# Patient Record
Sex: FEMALE | Race: WHITE | NOT HISPANIC OR LATINO | Employment: FULL TIME | URBAN - METROPOLITAN AREA
[De-identification: names, ages, dates, MRNs, and addresses within clinical notes are randomized per-mention and may not be internally consistent; named-entity substitution may affect disease eponyms.]

---

## 2017-01-31 ENCOUNTER — OFFICE VISIT (OUTPATIENT)
Dept: INTERNAL MEDICINE | Facility: CLINIC | Age: 47
End: 2017-01-31
Payer: COMMERCIAL

## 2017-01-31 VITALS
DIASTOLIC BLOOD PRESSURE: 90 MMHG | SYSTOLIC BLOOD PRESSURE: 130 MMHG | BODY MASS INDEX: 30.62 KG/M2 | HEIGHT: 63 IN | RESPIRATION RATE: 16 BRPM | WEIGHT: 172.81 LBS | TEMPERATURE: 98 F

## 2017-01-31 DIAGNOSIS — J98.8 RESPIRATORY INFECTION: Primary | ICD-10-CM

## 2017-01-31 PROBLEM — E66.811 OBESITY, CLASS I, BMI 30-34.9: Status: ACTIVE | Noted: 2017-01-31

## 2017-01-31 PROBLEM — E66.9 OBESITY, CLASS I, BMI 30-34.9: Status: ACTIVE | Noted: 2017-01-31

## 2017-01-31 PROCEDURE — 99999 PR PBB SHADOW E&M-EST. PATIENT-LVL III: CPT | Mod: PBBFAC,,, | Performed by: PHYSICIAN ASSISTANT

## 2017-01-31 PROCEDURE — 1159F MED LIST DOCD IN RCRD: CPT | Mod: S$GLB,,, | Performed by: PHYSICIAN ASSISTANT

## 2017-01-31 PROCEDURE — 99213 OFFICE O/P EST LOW 20 MIN: CPT | Mod: S$GLB,,, | Performed by: PHYSICIAN ASSISTANT

## 2017-01-31 RX ORDER — AZITHROMYCIN 500 MG/1
500 TABLET, FILM COATED ORAL DAILY
Qty: 10 TABLET | Refills: 0 | Status: SHIPPED | OUTPATIENT
Start: 2017-01-31 | End: 2017-02-10

## 2017-01-31 RX ORDER — BENZONATATE 200 MG/1
CAPSULE ORAL
Qty: 30 CAPSULE | Refills: 1 | Status: SHIPPED | OUTPATIENT
Start: 2017-01-31 | End: 2017-08-07

## 2017-01-31 NOTE — PROGRESS NOTES
Subjective:       Patient ID: Olga Alva is a 46 y.o.W/ female.    Chief Complaint: Sore Throat (x 2-3 weeks ); Sinus Problem ( x 2-3 weeks, small amount of blood ); and Cough (tinted blood x 2-3 weeks, worse at night )    HPI         She comes in by herself today and has the above problem.  She has been sick now for just a little over 2 weeks with both chest and nose congestion.  Her ears have felt full but have not been painful, her nose is draining and sometimes has specks of blood come out with it, her throat is a little sore but not severe, and she has been coughing up some yellowish lead tinge sputum on occasion.  Her cough is worse at night.  She denies any problem with: Fever, chills, rigors, earache, loss of hearing, loss of sense of taste but she has lost her sense of smell, wheeze, history of asthma, dyspnea of any modality, CP, pleurisy, sweats, diaphoresis, night sweats or near syncope.  No GI symptoms at this point.  She is not taking any OTC cold preparations at this time.        Her  has had a cough that is very minimal but he has not of the other symptoms this time.    Review of Systems    Otherwise negative concerning the RESPIRATORY, PULMONARY, CV, GI, and  system review.    Objective:      Physical Exam    EARS: TMs are transparent and clear with no posterior fluid.  The canals are normal without redness or swelling and minimal amount of wax present.  NOSE: Slightly pinker than normal but the turbinates are not edematous and there is no sign of fresh or old blood.  THROAT: Appears normal without redness or swelling.  There are no exudates or halitosis.  She does have some tender glands in the right anterior cervical chain but they're not enlarged.  CHEST: Clear BS anterior to posterior with good intensity.  There is no wheeze, rhonchi, or rales.  She's not in any respiratory distress.    Assessment:       1. Respiratory infection        Plan:     1.  She declined a  Depo-Medrol shot today.  She may decide she wants this shot in the next 5 days she can call in for a nurse visit to get it.  2.  Increased rest, liquids, and Tylenol and Chloraseptic gargles if necessary.  3.  Start antihistamine/decongestant of choice and also Mucinex DM 1200 mg to loosen and promote drainage from nose and chest.  4.  Add Zithromax 500 mg daily with food ×10 days.  5.  Also add Tessalon Perles 200 mg daily at bedtime to suppress cough at night.  She should not take this during the daytime.  6.  Recheck if no significant improvement within 7-10 days.

## 2017-07-20 ENCOUNTER — PATIENT MESSAGE (OUTPATIENT)
Dept: INTERNAL MEDICINE | Facility: CLINIC | Age: 47
End: 2017-07-20

## 2017-07-20 ENCOUNTER — TELEPHONE (OUTPATIENT)
Dept: INTERNAL MEDICINE | Facility: CLINIC | Age: 47
End: 2017-07-20

## 2017-07-20 RX ORDER — SULFAMETHOXAZOLE AND TRIMETHOPRIM 800; 160 MG/1; MG/1
1 TABLET ORAL 2 TIMES DAILY
Qty: 20 TABLET | Refills: 0 | Status: SHIPPED | OUTPATIENT
Start: 2017-07-20 | End: 2017-07-30

## 2017-08-07 ENCOUNTER — OFFICE VISIT (OUTPATIENT)
Dept: INTERNAL MEDICINE | Facility: CLINIC | Age: 47
End: 2017-08-07
Payer: COMMERCIAL

## 2017-08-07 VITALS
HEIGHT: 63 IN | DIASTOLIC BLOOD PRESSURE: 94 MMHG | WEIGHT: 173.31 LBS | TEMPERATURE: 98 F | BODY MASS INDEX: 30.71 KG/M2 | SYSTOLIC BLOOD PRESSURE: 144 MMHG | HEART RATE: 77 BPM | OXYGEN SATURATION: 97 %

## 2017-08-07 DIAGNOSIS — J01.90 ACUTE SINUSITIS, RECURRENCE NOT SPECIFIED, UNSPECIFIED LOCATION: Primary | ICD-10-CM

## 2017-08-07 PROCEDURE — 99213 OFFICE O/P EST LOW 20 MIN: CPT | Mod: 25,S$GLB,, | Performed by: PHYSICIAN ASSISTANT

## 2017-08-07 PROCEDURE — 99999 PR PBB SHADOW E&M-EST. PATIENT-LVL III: CPT | Mod: PBBFAC,,, | Performed by: PHYSICIAN ASSISTANT

## 2017-08-07 PROCEDURE — 96372 THER/PROPH/DIAG INJ SC/IM: CPT | Mod: S$GLB,,, | Performed by: PHYSICIAN ASSISTANT

## 2017-08-07 PROCEDURE — 3008F BODY MASS INDEX DOCD: CPT | Mod: S$GLB,,, | Performed by: PHYSICIAN ASSISTANT

## 2017-08-07 RX ORDER — BETAMETHASONE SODIUM PHOSPHATE AND BETAMETHASONE ACETATE 3; 3 MG/ML; MG/ML
6 INJECTION, SUSPENSION INTRA-ARTICULAR; INTRALESIONAL; INTRAMUSCULAR; SOFT TISSUE
Status: COMPLETED | OUTPATIENT
Start: 2017-08-07 | End: 2017-08-07

## 2017-08-07 RX ADMIN — BETAMETHASONE SODIUM PHOSPHATE AND BETAMETHASONE ACETATE 6 MG: 3; 3 INJECTION, SUSPENSION INTRA-ARTICULAR; INTRALESIONAL; INTRAMUSCULAR; SOFT TISSUE at 08:08

## 2017-08-07 NOTE — PATIENT INSTRUCTIONS
-Over-the-counter supportive tx for colds and cough:   -start flonase nasal spray (fluticasone) 1 spray each nostril once/day. Continue use for 2-3 weeks.   -Try OTC saline nasal spray a few times a day or nedi-pot using warm filtered water    - refrain from smoking, drink plenty of fluids, hot tea with honey, rest, take medications as prescribed, and use a humidifier or steam in the bathroom.   -Shower in the morning and evening to wash away any allergens and help reduce the production of mucus.   - Zinc lozenge, Emergen-C, or Airborne,  to boost immune system.     -No more than 10 in 24 hours.  -Cepacol sore throat/ cough drops  -Take tylenol or Advil for fever, sore throat, and muscle aches.  -warm salt water gargles or Listerine gargles for sore throat frequently throughout the day.  - Try OTC Mucinex (guafenesin) for cough with thick mucous.   -DM is for dextromethorphan. This is a cough suppressant.   -Phenylephrine/pseudophedrine or anything labeled with a D after it is for congestion.   Avoid decongestants if diagnosed with hypertension or arrhythmias. To avoid rebound congestion, refrain from taking decongestant for longer than 5 day.    -For prevention,extremely important to quit smoking, get annual influenza vaccines, reduce exposure to air pollution, and to frequently wash hands to avoid spread of infection.

## 2017-08-07 NOTE — PROGRESS NOTES
Subjective:      Patient ID: Olga Alva is a 47 y.o. female.    Chief Complaint: Cough; Nasal Congestion; and Poison Ivy    Cough   This is a new problem. The current episode started in the past 7 days. The problem has been gradually worsening. The problem occurs constantly. The cough is productive of purulent sputum. Associated symptoms include headaches, myalgias, nasal congestion, postnasal drip, rhinorrhea, shortness of breath and sweats. Pertinent negatives include no chest pain, chills, ear congestion, ear pain, fever, heartburn, hemoptysis, rash, sore throat, weight loss or wheezing. The symptoms are aggravated by lying down. She has tried OTC cough suppressant for the symptoms. The treatment provided mild relief. There is no history of asthma, bronchiectasis, bronchitis, COPD, emphysema, environmental allergies or pneumonia.       Review of Systems   Constitutional: Positive for fatigue. Negative for activity change, appetite change, chills, diaphoresis, fever, unexpected weight change and weight loss.   HENT: Positive for postnasal drip, rhinorrhea and sneezing. Negative for congestion, ear pain, hearing loss, sinus pressure, sore throat, trouble swallowing and voice change.    Eyes: Positive for itching. Negative for pain and visual disturbance.   Respiratory: Positive for cough and shortness of breath. Negative for hemoptysis, choking, chest tightness and wheezing.    Cardiovascular: Negative for chest pain, palpitations and leg swelling.   Gastrointestinal: Negative for abdominal distention, abdominal pain, blood in stool, constipation, diarrhea, heartburn, nausea and vomiting.   Endocrine: Negative for cold intolerance, heat intolerance, polydipsia and polyuria.   Genitourinary: Negative.  Negative for difficulty urinating and frequency.   Musculoskeletal: Positive for myalgias. Negative for arthralgias, back pain, gait problem, joint swelling, neck pain and neck stiffness.   Skin:  "Negative for color change, pallor, rash and wound.   Allergic/Immunologic: Negative for environmental allergies.   Neurological: Positive for headaches. Negative for dizziness, tremors, weakness, light-headedness and numbness.   Hematological: Negative for adenopathy.   Psychiatric/Behavioral: Negative for behavioral problems, confusion, self-injury, sleep disturbance and suicidal ideas. The patient is not nervous/anxious.      Objective:   BP (!) 144/94   Pulse 77   Temp 97.8 °F (36.6 °C) (Tympanic)   Ht 5' 3" (1.6 m)   Wt 78.6 kg (173 lb 4.5 oz)   LMP 06/22/2016   SpO2 97%   BMI 30.70 kg/m²     Physical Exam   Constitutional: She is oriented to person, place, and time. She appears well-developed and well-nourished.   HENT:   Head: Normocephalic and atraumatic.   Right Ear: Hearing, tympanic membrane, external ear and ear canal normal. No tenderness.   Left Ear: Hearing, tympanic membrane, external ear and ear canal normal. No tenderness.   Nose: Mucosal edema and rhinorrhea present. No sinus tenderness. Right sinus exhibits maxillary sinus tenderness and frontal sinus tenderness. Left sinus exhibits maxillary sinus tenderness and frontal sinus tenderness.   Mouth/Throat: Uvula is midline and mucous membranes are normal. No oral lesions. Normal dentition. No dental abscesses, uvula swelling or dental caries. Oropharyngeal exudate and posterior oropharyngeal erythema present. No posterior oropharyngeal edema or tonsillar abscesses. No tonsillar exudate.   Eyes: Conjunctivae and EOM are normal. Pupils are equal, round, and reactive to light. Right eye exhibits no discharge. Left eye exhibits no discharge.   Neck: Normal range of motion. Neck supple.   Cardiovascular: Normal rate, regular rhythm and normal heart sounds.  Exam reveals no gallop and no friction rub.    No murmur heard.  Pulmonary/Chest: Effort normal and breath sounds normal. No respiratory distress. She has no wheezes. She has no rales. "   Lymphadenopathy:     She has no cervical adenopathy.   Neurological: She is alert and oriented to person, place, and time.   Skin: Skin is warm. Capillary refill takes less than 2 seconds. No rash noted. No erythema. No pallor.   Psychiatric: She has a normal mood and affect. Her behavior is normal. Judgment and thought content normal.   Nursing note and vitals reviewed.      Assessment:     1. Acute sinusitis, recurrence not specified, unspecified location      Plan:   Acute sinusitis, recurrence not specified, unspecified location  -     betamethasone acetate-betamethasone sodium phosphate injection 6 mg; Inject 1 mL (6 mg total) into the muscle one time.    -Steroids can increase blood sugar and blood pressure. Therefore, diabetics need to check their blood sugar regularly while taking a steroid. Patients with hypertension need to check their blood pressure regularly as well.    -Educational handout on over-the-counter medications and at-home conservative care, pertinent to the patients diagnosis today, was handed to the patient and discussed in detail.  -flonase daily, saline nasal spray, cepacol max sore throat drops  -if no improvement by Thursday or if symptoms severe, will call in abx to pharmacy    Return if symptoms worsen or fail to improve.

## 2017-08-09 ENCOUNTER — PATIENT MESSAGE (OUTPATIENT)
Dept: INTERNAL MEDICINE | Facility: CLINIC | Age: 47
End: 2017-08-09

## 2017-08-09 DIAGNOSIS — J01.90 ACUTE SINUSITIS, RECURRENCE NOT SPECIFIED, UNSPECIFIED LOCATION: Primary | ICD-10-CM

## 2017-08-09 RX ORDER — AMOXICILLIN AND CLAVULANATE POTASSIUM 875; 125 MG/1; MG/1
1 TABLET, FILM COATED ORAL 2 TIMES DAILY
Qty: 20 TABLET | Refills: 0 | Status: SHIPPED | OUTPATIENT
Start: 2017-08-09 | End: 2017-08-19

## 2017-08-30 ENCOUNTER — HOSPITAL ENCOUNTER (OUTPATIENT)
Dept: RADIOLOGY | Facility: HOSPITAL | Age: 47
Discharge: HOME | End: 2017-08-30
Attending: NURSE PRACTITIONER
Payer: COMMERCIAL

## 2017-08-30 ENCOUNTER — CLINICAL SUPPORT (OUTPATIENT)
Dept: CARDIOLOGY | Facility: CLINIC | Age: 47
End: 2017-08-30
Payer: COMMERCIAL

## 2017-08-30 ENCOUNTER — OFFICE VISIT (OUTPATIENT)
Dept: INTERNAL MEDICINE | Facility: CLINIC | Age: 47
End: 2017-08-30
Payer: COMMERCIAL

## 2017-08-30 VITALS
HEART RATE: 75 BPM | WEIGHT: 169.75 LBS | BODY MASS INDEX: 30.08 KG/M2 | DIASTOLIC BLOOD PRESSURE: 88 MMHG | TEMPERATURE: 98 F | HEIGHT: 63 IN | SYSTOLIC BLOOD PRESSURE: 136 MMHG

## 2017-08-30 DIAGNOSIS — Z01.818 PREOPERATIVE CLEARANCE: ICD-10-CM

## 2017-08-30 DIAGNOSIS — Z01.818 PREOPERATIVE CLEARANCE: Primary | ICD-10-CM

## 2017-08-30 PROCEDURE — 99213 OFFICE O/P EST LOW 20 MIN: CPT | Mod: S$GLB,,, | Performed by: NURSE PRACTITIONER

## 2017-08-30 PROCEDURE — 99999 PR PBB SHADOW E&M-EST. PATIENT-LVL III: CPT | Mod: PBBFAC,,, | Performed by: NURSE PRACTITIONER

## 2017-08-30 PROCEDURE — 3008F BODY MASS INDEX DOCD: CPT | Mod: S$GLB,,, | Performed by: NURSE PRACTITIONER

## 2017-08-30 PROCEDURE — 71020 XR CHEST PA AND LATERAL: CPT | Mod: 26,,, | Performed by: RADIOLOGY

## 2017-08-30 PROCEDURE — 71020 XR CHEST PA AND LATERAL: CPT | Mod: TC,PO

## 2017-08-30 PROCEDURE — 93000 ELECTROCARDIOGRAM COMPLETE: CPT | Mod: S$GLB,,, | Performed by: INTERNAL MEDICINE

## 2017-08-30 NOTE — PROGRESS NOTES
Subjective:       Patient ID: Olga Alva is a 47 y.o. female.    Chief Complaint: Pre-op Exam    Patient presents for pre-operative clearance for right arthroscopy by Dr. Isai Ortega with [facility].  Has of playing tennis since she was [age] years old.  Reports that she will be scheduled once she's cleared for surgery.  Denies any other complaints.  Has some pain and swelling to left knee.  Reports no medical history.  Denies taking any medications prescribed or OTC.        Review of Systems   Constitutional: Negative for activity change, chills and unexpected weight change.   HENT: Negative for hearing loss, rhinorrhea and trouble swallowing.    Eyes: Negative for discharge.   Respiratory: Negative for chest tightness and wheezing.    Cardiovascular: Negative for chest pain and palpitations.   Gastrointestinal: Negative for blood in stool, constipation, diarrhea and vomiting.   Endocrine: Negative for polydipsia and polyuria.   Genitourinary: Negative for difficulty urinating, dysuria, hematuria and menstrual problem.   Musculoskeletal: Negative for arthralgias, joint swelling and neck pain.   Neurological: Negative for weakness and headaches.   Psychiatric/Behavioral: Negative for confusion and dysphoric mood.       Objective:      Physical Exam   Constitutional: She is oriented to person, place, and time. She appears well-developed and well-nourished.   HENT:   Head: Normocephalic.   Right Ear: Hearing, tympanic membrane, external ear and ear canal normal.   Left Ear: Hearing, tympanic membrane, external ear and ear canal normal.   Nose: Nose normal.   Mouth/Throat: Uvula is midline and oropharynx is clear and moist.   Neck: Normal range of motion.   Cardiovascular: Normal rate, regular rhythm, normal heart sounds and intact distal pulses.    Pulses:       Radial pulses are 2+ on the right side, and 2+ on the left side.        Dorsalis pedis pulses are 2+ on the right side, and 2+  on the left side.   Pulmonary/Chest: Effort normal and breath sounds normal.   Abdominal: Soft. Bowel sounds are normal. There is no tenderness.   Musculoskeletal: Normal range of motion. She exhibits edema (mild to left knee).   Neurological: She is alert and oriented to person, place, and time. She has normal strength.   Skin: Skin is warm. Capillary refill takes less than 2 seconds. No erythema.   Psychiatric: She has a normal mood and affect. Her behavior is normal.       Assessment:       1. Preoperative clearance        Plan:         Preoperative clearance  -     CBC auto differential; Future; Expected date: 08/30/2017  -     Comprehensive metabolic panel; Future; Expected date: 08/30/2017  -     Urinalysis; Future; Expected date: 08/30/2017  -     X-Ray Chest PA And Lateral; Future; Expected date: 08/30/2017  -     EKG 12-lead; Future; Expected date: 08/30/2017      Physical exam normal.  Will await labs and imaging for final clearance.

## 2018-01-21 ENCOUNTER — PATIENT MESSAGE (OUTPATIENT)
Dept: INTERNAL MEDICINE | Facility: CLINIC | Age: 48
End: 2018-01-21

## 2018-05-02 ENCOUNTER — PATIENT MESSAGE (OUTPATIENT)
Dept: INTERNAL MEDICINE | Facility: CLINIC | Age: 48
End: 2018-05-02

## 2018-05-04 ENCOUNTER — OFFICE VISIT (OUTPATIENT)
Dept: INTERNAL MEDICINE | Facility: CLINIC | Age: 48
End: 2018-05-04
Payer: COMMERCIAL

## 2018-05-04 ENCOUNTER — LAB VISIT (OUTPATIENT)
Dept: LAB | Facility: HOSPITAL | Age: 48
End: 2018-05-04
Attending: PEDIATRICS
Payer: COMMERCIAL

## 2018-05-04 VITALS
SYSTOLIC BLOOD PRESSURE: 142 MMHG | BODY MASS INDEX: 29.26 KG/M2 | OXYGEN SATURATION: 98 % | TEMPERATURE: 99 F | HEART RATE: 76 BPM | DIASTOLIC BLOOD PRESSURE: 88 MMHG | HEIGHT: 63 IN | WEIGHT: 165.13 LBS

## 2018-05-04 DIAGNOSIS — Z00.00 WELL ADULT EXAM: Primary | ICD-10-CM

## 2018-05-04 DIAGNOSIS — Z00.00 WELL ADULT EXAM: ICD-10-CM

## 2018-05-04 DIAGNOSIS — M17.0 PRIMARY OSTEOARTHRITIS OF BOTH KNEES: ICD-10-CM

## 2018-05-04 LAB
ERYTHROCYTE [SEDIMENTATION RATE] IN BLOOD BY WESTERGREN METHOD: 8 MM/HR
RHEUMATOID FACT SERPL-ACNC: <10 IU/ML

## 2018-05-04 PROCEDURE — 80048 BASIC METABOLIC PNL TOTAL CA: CPT

## 2018-05-04 PROCEDURE — 36415 COLL VENOUS BLD VENIPUNCTURE: CPT | Mod: PO

## 2018-05-04 PROCEDURE — 84443 ASSAY THYROID STIM HORMONE: CPT

## 2018-05-04 PROCEDURE — 85025 COMPLETE CBC W/AUTO DIFF WBC: CPT

## 2018-05-04 PROCEDURE — 82306 VITAMIN D 25 HYDROXY: CPT

## 2018-05-04 PROCEDURE — 99213 OFFICE O/P EST LOW 20 MIN: CPT | Mod: S$GLB,,, | Performed by: PEDIATRICS

## 2018-05-04 PROCEDURE — 99999 PR PBB SHADOW E&M-EST. PATIENT-LVL III: CPT | Mod: PBBFAC,,, | Performed by: PEDIATRICS

## 2018-05-04 PROCEDURE — 85651 RBC SED RATE NONAUTOMATED: CPT | Mod: PO

## 2018-05-04 PROCEDURE — 3008F BODY MASS INDEX DOCD: CPT | Mod: CPTII,S$GLB,, | Performed by: PEDIATRICS

## 2018-05-04 PROCEDURE — 86431 RHEUMATOID FACTOR QUANT: CPT

## 2018-05-04 PROCEDURE — 86038 ANTINUCLEAR ANTIBODIES: CPT

## 2018-05-04 PROCEDURE — 80061 LIPID PANEL: CPT

## 2018-05-04 RX ORDER — GLUCOSAMINE/CHONDRO SU A 500-400 MG
1 TABLET ORAL DAILY
COMMUNITY
End: 2023-02-14

## 2018-05-04 RX ORDER — AMOXICILLIN 500 MG
2 CAPSULE ORAL
COMMUNITY
End: 2019-03-26

## 2018-05-04 RX ORDER — IBUPROFEN AND FAMOTIDINE 26.6; 8 MG/1; MG/1
1 TABLET ORAL 3 TIMES DAILY
Start: 2018-05-04 | End: 2020-09-24

## 2018-05-04 NOTE — PROGRESS NOTES
Subjective:       Patient ID: Olga Alva is a 48 y.o. female.    Chief Complaint: Knee Pain    She is here for concerns of her knees. She is seen by Dr Ortega and brings in records. She has bilateral osteoarthritis of knees, right lateral and medial meniscal disease and right plica. She has had knee surgeries beginning in 2013 of left knee and right. Both knees painful. She has had steroid injection w/o relief. Has not yet had synvisc injection. Is taking dulexa prn with some relief and glucosamine chondrontin with arlene. Has had PT. No longer runs or tennis, swims, yoga now. Brother has RA. She wishes me to review records and make recommendation.    I have review records with patient, I have recommended she try the single dose synvisc, tumeric, ibuprofen/famotidine prn, continue to be active with joint sparing activity as she is doing, use knee sleeve. Check but suspect low yield, RF, sed rate, SUMIT. F/u in 1 month with  scrrening labs. 30 minutes with patient.      Knee Pain        Review of Systems    Objective:      Physical Exam    Assessment:       1. Well adult exam    2. Primary osteoarthritis of both knees        Plan:       Well adult exam  -     TSH; Future; Expected date: 05/04/2018  -     CBC auto differential; Future; Expected date: 05/04/2018  -     Basic metabolic panel; Future; Expected date: 05/04/2018  -     Lipid panel; Future; Expected date: 05/04/2018  -     Vitamin D; Future; Expected date: 05/04/2018    Primary osteoarthritis of both knees  -     ibuprofen-famotidine 800-26.6 mg Tab; Take 1 tablet by mouth 3 (three) times daily.  -     SUMIT; Future; Expected date: 05/04/2018  -     Sedimentation rate, manual; Future; Expected date: 05/04/2018  -     Rheumatoid factor; Future; Expected date: 05/04/2018

## 2018-05-05 LAB
25(OH)D3+25(OH)D2 SERPL-MCNC: 38 NG/ML
ANION GAP SERPL CALC-SCNC: 10 MMOL/L
BASOPHILS # BLD AUTO: 0.03 K/UL
BASOPHILS NFR BLD: 0.4 %
BUN SERPL-MCNC: 16 MG/DL
CALCIUM SERPL-MCNC: 9.8 MG/DL
CHLORIDE SERPL-SCNC: 104 MMOL/L
CHOLEST SERPL-MCNC: 252 MG/DL
CHOLEST/HDLC SERPL: 4.7 {RATIO}
CO2 SERPL-SCNC: 23 MMOL/L
CREAT SERPL-MCNC: 0.8 MG/DL
DIFFERENTIAL METHOD: NORMAL
EOSINOPHIL # BLD AUTO: 0.1 K/UL
EOSINOPHIL NFR BLD: 0.7 %
ERYTHROCYTE [DISTWIDTH] IN BLOOD BY AUTOMATED COUNT: 13.2 %
EST. GFR  (AFRICAN AMERICAN): >60 ML/MIN/1.73 M^2
EST. GFR  (NON AFRICAN AMERICAN): >60 ML/MIN/1.73 M^2
GLUCOSE SERPL-MCNC: 87 MG/DL
HCT VFR BLD AUTO: 45.7 %
HDLC SERPL-MCNC: 54 MG/DL
HDLC SERPL: 21.4 %
HGB BLD-MCNC: 14.8 G/DL
IMM GRANULOCYTES # BLD AUTO: 0.02 K/UL
IMM GRANULOCYTES NFR BLD AUTO: 0.2 %
LDLC SERPL CALC-MCNC: 179.4 MG/DL
LYMPHOCYTES # BLD AUTO: 2.3 K/UL
LYMPHOCYTES NFR BLD: 28.1 %
MCH RBC QN AUTO: 28.7 PG
MCHC RBC AUTO-ENTMCNC: 32.4 G/DL
MCV RBC AUTO: 89 FL
MONOCYTES # BLD AUTO: 0.5 K/UL
MONOCYTES NFR BLD: 5.9 %
NEUTROPHILS # BLD AUTO: 5.2 K/UL
NEUTROPHILS NFR BLD: 64.7 %
NONHDLC SERPL-MCNC: 198 MG/DL
NRBC BLD-RTO: 0 /100 WBC
PLATELET # BLD AUTO: 226 K/UL
PMV BLD AUTO: 11.1 FL
POTASSIUM SERPL-SCNC: 4 MMOL/L
RBC # BLD AUTO: 5.16 M/UL
SODIUM SERPL-SCNC: 137 MMOL/L
TRIGL SERPL-MCNC: 93 MG/DL
TSH SERPL DL<=0.005 MIU/L-ACNC: 1.44 UIU/ML
WBC # BLD AUTO: 8.01 K/UL

## 2018-05-07 LAB — ANA SER QL IF: NORMAL

## 2018-05-10 ENCOUNTER — PATIENT MESSAGE (OUTPATIENT)
Dept: INTERNAL MEDICINE | Facility: CLINIC | Age: 48
End: 2018-05-10

## 2018-05-10 RX ORDER — METHYLPREDNISOLONE 4 MG/1
TABLET ORAL
Qty: 1 PACKAGE | Refills: 0 | Status: SHIPPED | OUTPATIENT
Start: 2018-05-10 | End: 2018-06-14

## 2018-05-10 RX ORDER — BETAMETHASONE DIPROPIONATE 0.5 MG/G
CREAM TOPICAL 2 TIMES DAILY
Qty: 60 G | Refills: 0 | Status: SHIPPED | OUTPATIENT
Start: 2018-05-10 | End: 2019-03-26

## 2018-05-22 ENCOUNTER — PATIENT OUTREACH (OUTPATIENT)
Dept: ADMINISTRATIVE | Facility: HOSPITAL | Age: 48
End: 2018-05-22

## 2018-06-07 ENCOUNTER — PATIENT MESSAGE (OUTPATIENT)
Dept: INTERNAL MEDICINE | Facility: CLINIC | Age: 48
End: 2018-06-07

## 2018-06-14 ENCOUNTER — DOCUMENTATION ONLY (OUTPATIENT)
Dept: ENDOSCOPY | Facility: HOSPITAL | Age: 48
End: 2018-06-14

## 2018-06-14 ENCOUNTER — OFFICE VISIT (OUTPATIENT)
Dept: INTERNAL MEDICINE | Facility: CLINIC | Age: 48
End: 2018-06-14
Payer: COMMERCIAL

## 2018-06-14 VITALS
DIASTOLIC BLOOD PRESSURE: 85 MMHG | OXYGEN SATURATION: 100 % | HEIGHT: 63 IN | HEART RATE: 70 BPM | SYSTOLIC BLOOD PRESSURE: 125 MMHG | BODY MASS INDEX: 30.16 KG/M2 | TEMPERATURE: 98 F | WEIGHT: 170.19 LBS

## 2018-06-14 DIAGNOSIS — Z12.11 COLON CANCER SCREENING: ICD-10-CM

## 2018-06-14 DIAGNOSIS — Z00.00 WELL ADULT EXAM: Primary | ICD-10-CM

## 2018-06-14 DIAGNOSIS — E78.5 DYSLIPIDEMIA: ICD-10-CM

## 2018-06-14 PROCEDURE — 99396 PREV VISIT EST AGE 40-64: CPT | Mod: S$GLB,,, | Performed by: PEDIATRICS

## 2018-06-14 PROCEDURE — 99999 PR PBB SHADOW E&M-EST. PATIENT-LVL III: CPT | Mod: PBBFAC,,, | Performed by: PEDIATRICS

## 2018-06-14 RX ORDER — SODIUM, POTASSIUM,MAG SULFATES 17.5-3.13G
SOLUTION, RECONSTITUTED, ORAL ORAL
Qty: 354 ML | Refills: 0 | Status: ON HOLD | OUTPATIENT
Start: 2018-06-14 | End: 2018-08-03 | Stop reason: CLARIF

## 2018-06-14 RX ORDER — EPINEPHRINE 0.22MG
100 AEROSOL WITH ADAPTER (ML) INHALATION DAILY
COMMUNITY
End: 2023-08-31

## 2018-06-14 RX ORDER — ATORVASTATIN CALCIUM 10 MG/1
10 TABLET, FILM COATED ORAL DAILY
Qty: 90 TABLET | Refills: 3 | Status: SHIPPED | OUTPATIENT
Start: 2018-06-14 | End: 2019-07-03 | Stop reason: SDUPTHER

## 2018-06-14 NOTE — PROGRESS NOTES
Subjective:         Patient ID: Olga Alva is a 48 y.o. female.     Chief Complaint: Wellness exam     HPI Comments: Wellness: doing well  Anxiety/depression quiet off lamictal. No HI/SI. Will be removed form list  IHad injections by ortho for knees, is doing better..  LABS REVIEWED AND DISCUSSED WITH PATIENT     Review of Systems   Constitutional: Negative for fever, chills, diaphoresis, activity change, appetite change, fatigue and unexpected weight change.   HENT: Negative for congestion, ear pain, mouth sores, nosebleeds, postnasal drip, rhinorrhea, sneezing and sore throat.   Eyes: Negative for photophobia, pain, discharge, redness and visual disturbance.   Respiratory: Negative for cough, chest tightness, shortness of breath, wheezing and stridor.   Cardiovascular: Negative for chest pain, palpitations and leg swelling.   Gastrointestinal: Negative for nausea, vomiting, diarrhea, constipation, blood in stool and abdominal distention.   Endocrine: Negative for cold intolerance, heat intolerance, polydipsia, polyphagia and polyuria.   Genitourinary: Urinary symptoms per urology. No vaginal bleeding, vaginal discharge, difficulty urinating, genital sores, vaginal pain, menstrual problem, pelvic pain and dyspareunia.   Musculoskeletal: Negative for back pain, joint swelling, arthralgias, neck pain and neck stiffness.   Skin: Negative for color change and rash.   Neurological: Negative for dizziness, syncope, speech difficulty, weakness, light-headedness and headaches.   Hematological: Negative for adenopathy. Does not bruise/bleed easily.   Psychiatric/Behavioral: Negative for suicidal ideas, hallucinations, confusion, sleep disturbance, dysphoric mood and decreased concentration. The patient is not nervous/anxious.        Objective:       Physical Exam   Constitutional: She is oriented to person, place, and time. She appears well-developed and well-nourished. She is cooperative.   HENT:    Head:  Normocephalic and atraumatic.   Eyes: Conjunctivae, EOM and lids are normal. Pupils are equal, round, and reactive to light.   Neck: Trachea normal and normal range of motion. Neck supple. No JVD present. Carotid bruit is not present. No Brudzinski's sign and no Kernig's sign noted. No thyroid mass and no thyromegaly present.   Cardiovascular: Normal rate, regular rhythm, normal heart sounds and normal pulses.    No murmur heard.  Pulmonary/Chest: Effort normal and breath sounds normal. She has no wheezes. She has no rhonchi. She has no rales.   Abdominal: Soft. Normal appearance. There is no hepatosplenomegaly. There is no tenderness. There is no rebound and no CVA tenderness.   Lymphadenopathy:   She has no cervical adenopathy.   Neurological: She is alert and oriented to person, place, and time. She has normal strength and normal reflexes. No cranial nerve deficit or sensory deficit. Coordination and gait normal.   Skin: Skin is warm. No abrasion and no rash noted.   Psychiatric: She has a normal mood and affect. Her speech is normal and behavior is normal. Judgment and thought content normal. Her mood appears not anxious. Cognition and memory are normal. She does not exhibit a depressed mood.        Assessment:         1.  Well adult exam     2.  dyslipidemia   3.   colon cancer screening  Plan:    We will start lipitor after d/w patient.  D&E. Colonoscopy due. Has GYN up to date.. F/u 3 months with labs.

## 2018-06-14 NOTE — PROGRESS NOTES
Incoming call. Pt scheduled appt. colonoscopy prep instructions sent via [address]. Included were suprep instructions and Low fiber diet.     Pt stated she would prefer to be scheduled with  once his schedule is available in August.

## 2018-06-18 ENCOUNTER — PATIENT MESSAGE (OUTPATIENT)
Dept: INTERNAL MEDICINE | Facility: CLINIC | Age: 48
End: 2018-06-18

## 2018-08-02 RX ORDER — SODIUM CHLORIDE 0.9 % (FLUSH) 0.9 %
3 SYRINGE (ML) INJECTION
Status: CANCELLED | OUTPATIENT
Start: 2018-08-02

## 2018-08-03 ENCOUNTER — ANESTHESIA (OUTPATIENT)
Dept: ENDOSCOPY | Facility: HOSPITAL | Age: 48
End: 2018-08-03
Payer: COMMERCIAL

## 2018-08-03 ENCOUNTER — ANESTHESIA EVENT (OUTPATIENT)
Dept: ENDOSCOPY | Facility: HOSPITAL | Age: 48
End: 2018-08-03
Payer: COMMERCIAL

## 2018-08-03 ENCOUNTER — SURGERY (OUTPATIENT)
Age: 48
End: 2018-08-03

## 2018-08-03 ENCOUNTER — HOSPITAL ENCOUNTER (OUTPATIENT)
Facility: HOSPITAL | Age: 48
Discharge: HOME | End: 2018-08-03
Attending: INTERNAL MEDICINE | Admitting: INTERNAL MEDICINE
Payer: COMMERCIAL

## 2018-08-03 VITALS
RESPIRATION RATE: 18 BRPM | SYSTOLIC BLOOD PRESSURE: 138 MMHG | DIASTOLIC BLOOD PRESSURE: 88 MMHG | HEART RATE: 68 BPM | BODY MASS INDEX: 29.95 KG/M2 | WEIGHT: 169 LBS | HEIGHT: 63 IN | OXYGEN SATURATION: 100 % | TEMPERATURE: 98 F

## 2018-08-03 DIAGNOSIS — Z12.11 COLON CANCER SCREENING: ICD-10-CM

## 2018-08-03 PROCEDURE — 37000008 HC ANESTHESIA 1ST 15 MINUTES: Performed by: INTERNAL MEDICINE

## 2018-08-03 PROCEDURE — 25000003 PHARM REV CODE 250: Performed by: INTERNAL MEDICINE

## 2018-08-03 PROCEDURE — G0121 COLON CA SCRN NOT HI RSK IND: HCPCS | Mod: ,,, | Performed by: INTERNAL MEDICINE

## 2018-08-03 PROCEDURE — 63600175 PHARM REV CODE 636 W HCPCS: Performed by: NURSE ANESTHETIST, CERTIFIED REGISTERED

## 2018-08-03 PROCEDURE — 37000009 HC ANESTHESIA EA ADD 15 MINS: Performed by: INTERNAL MEDICINE

## 2018-08-03 PROCEDURE — G0121 COLON CA SCRN NOT HI RSK IND: HCPCS | Performed by: INTERNAL MEDICINE

## 2018-08-03 RX ORDER — PROPOFOL 10 MG/ML
INJECTION, EMULSION INTRAVENOUS
Status: DISCONTINUED | OUTPATIENT
Start: 2018-08-03 | End: 2018-08-03

## 2018-08-03 RX ORDER — LIDOCAINE HCL/PF 100 MG/5ML
SYRINGE (ML) INTRAVENOUS
Status: DISCONTINUED | OUTPATIENT
Start: 2018-08-03 | End: 2018-08-03

## 2018-08-03 RX ORDER — SODIUM CHLORIDE, SODIUM LACTATE, POTASSIUM CHLORIDE, CALCIUM CHLORIDE 600; 310; 30; 20 MG/100ML; MG/100ML; MG/100ML; MG/100ML
INJECTION, SOLUTION INTRAVENOUS CONTINUOUS
Status: DISCONTINUED | OUTPATIENT
Start: 2018-08-03 | End: 2018-08-03 | Stop reason: HOSPADM

## 2018-08-03 RX ADMIN — PROPOFOL 30 MG: 10 INJECTION, EMULSION INTRAVENOUS at 07:08

## 2018-08-03 RX ADMIN — PROPOFOL 130 MG: 10 INJECTION, EMULSION INTRAVENOUS at 07:08

## 2018-08-03 RX ADMIN — SODIUM CHLORIDE, SODIUM LACTATE, POTASSIUM CHLORIDE, AND CALCIUM CHLORIDE: 600; 310; 30; 20 INJECTION, SOLUTION INTRAVENOUS at 06:08

## 2018-08-03 RX ADMIN — SODIUM CHLORIDE, SODIUM LACTATE, POTASSIUM CHLORIDE, AND CALCIUM CHLORIDE: 600; 310; 30; 20 INJECTION, SOLUTION INTRAVENOUS at 07:08

## 2018-08-03 RX ADMIN — LIDOCAINE HYDROCHLORIDE 100 MG: 20 INJECTION, SOLUTION INTRAVENOUS at 07:08

## 2018-08-03 NOTE — ANESTHESIA RELEASE NOTE
"Anesthesia Release from PACU Note    Patient: Olga Alva    Procedure(s) Performed: Procedure(s) (LRB):  COLONOSCOPY (N/A)    Anesthesia type: MAC    Post pain: Adequate analgesia    Post assessment: no apparent anesthetic complications, tolerated procedure well and no evidence of recall    Last Vitals:   Visit Vitals  /82 (BP Location: Left arm, Patient Position: Lying)   Pulse 69   Temp 36.7 °C (98.1 °F) (Oral)   Resp 18   Ht 5' 3" (1.6 m)   Wt 76.7 kg (169 lb)   LMP 06/22/2016   SpO2 97%   Breastfeeding? No   BMI 29.94 kg/m²       Post vital signs: stable    Level of consciousness: awake and alert     Nausea/Vomiting: no nausea/no vomiting    Complications: none    Airway Patency: patent    Respiratory: unassisted, spontaneous ventilation, room air    Cardiovascular: stable    Hydration: euvolemic  "

## 2018-08-03 NOTE — DISCHARGE SUMMARY
[facility]  Brief Operative Note     SUMMARY     Surgery Date: 8/3/2018     Surgeon(s) and Role:     * Mauricio Brown III, MD - Primary    Assisting Surgeon: None    Pre-op Diagnosis:  Colon cancer screening [Z12.11]    Post-op Diagnosis:  Post-Op Diagnosis Codes:     * Colon cancer screening [Z12.11]    Procedure(s) (LRB):  COLONOSCOPY (N/A)    Anesthesia: Choice    Description of the findings of the procedure: Procedures completed. See Procedure note for full details.    Findings/Key Components: Procedures completed. See Procedure note for full details.    Prosthetics/Devices: None    Estimated Blood Loss: * No values recorded between 8/3/2018 12:00 AM and 8/3/2018  7:40 AM *         Specimens:   Specimen (12h ago through future)    None          Discharge Note    SUMMARY     Admit Date: 8/3/2018    Discharge Date and Time: 8/3/2018    Hospital Course (synopsis of major diagnoses, care, treatment, and services provided during the course of the hospital stay):  Procedures completed. See Procedure note for full details. Discharge patient when discharge criteria met.    Final Diagnosis: Post-Op Diagnosis Codes:     * Colon cancer screening [Z12.11]    Disposition: Discharge patient when discharge criteria met.    Follow Up/Patient Instructions:       Medications:  Reconciled Home Medications: Current Discharge Medication List      CONTINUE these medications which have NOT CHANGED    Details   atorvastatin (LIPITOR) 10 MG tablet Take 1 tablet (10 mg total) by mouth once daily.  Qty: 90 tablet, Refills: 3    Associated Diagnoses: Dyslipidemia      coenzyme Q10 (CO Q-10) 100 mg capsule Take 100 mg by mouth once daily.      fish oil-omega-3 fatty acids 300-1,000 mg capsule Take 2 g by mouth.      glucosamine-chondroitin 500-400 mg tablet Take by mouth 2 (two) times daily.       betamethasone dipropionate (DIPROLENE) 0.05 % cream Apply topically 2 (two) times daily.  Qty: 60 g, Refills: 0       ibuprofen-famotidine 800-26.6 mg Tab Take 1 tablet by mouth 3 (three) times daily.    Associated Diagnoses: Primary osteoarthritis of both knees              Discharge Procedure Orders  Diet general     Activity as tolerated

## 2018-08-03 NOTE — H&P
Short Stay Endoscopy History and Physical    PCP - ROMEO Olivera Jr, MD    Procedure - Colonoscopy  ASA - 2  Mallampati - per anesthesia  History of Anesthesia problems - no  Family history Anesthesia problems -  no     HPI:  This is a 48 y.o.female here for evaluation of : Colon Cancer Screen    Reflux - no  Dysphagia - no  Abdominal pain - no  Diarrhea - no  Anemia - no  GI bleeding - no  Nausea and vomiting-no  Early satiety-no  aversion to sight or smell of food-no    ROS:  Constitutional: No fevers, chills, No weight loss  ENT: No allergies  CV: No chest pain  Pulm: No cough, No shortness of breath  Ophtho: No vision changes  GI: see HPI  Derm: No rash  Heme: No lymphadenopathy, No bruising  MSK: No arthritis  : No dysuria, No hematuria  Endo: No hot or cold intolerance  Neuro: No syncope, No seizure  Psych: No anxiety, No depression    Medical History:  Past Medical History:   Diagnosis Date    Anxiety     Depression        Surgical History:  Past Surgical History:   Procedure Laterality Date    BLADDER SURGERY  2016    HYSTERECTOMY      JOINT REPLACEMENT      KNEE ARTHROSCOPY W/ ACL RECONSTRUCTION         Family History:  Family History   Problem Relation Age of Onset    Breast cancer Maternal Grandmother     Diabetes Maternal Grandmother     Hypertension Father     Diabetes Mother     Diabetes Brother     Hypertension Brother     Diabetes Sister     Miscarriages / Stillbirths Sister     Cancer Neg Hx     Colon cancer Neg Hx     Eclampsia Neg Hx     Ovarian cancer Neg Hx      labor Neg Hx     Stroke Neg Hx        Social History:  Social History     Social History    Marital status:      Spouse name: N/A    Number of children: N/A    Years of education: N/A     Occupational History     [employer]     Social History Main Topics    Smoking status: Never Smoker    Smokeless tobacco: Never Used    Alcohol use No    Drug use: No    Sexual activity:  Yes     Partners: Male     Birth control/ protection: None      Comment: vasectomy     Other Topics Concern    Not on file     Social History Narrative    No narrative on file       Allergies: Review of patient's allergies indicates:  No Known Allergies    Medications:   No current facility-administered medications on file prior to encounter.      Current Outpatient Prescriptions on File Prior to Encounter   Medication Sig Dispense Refill    atorvastatin (LIPITOR) 10 MG tablet Take 1 tablet (10 mg total) by mouth once daily. 90 tablet 3    betamethasone dipropionate (DIPROLENE) 0.05 % cream Apply topically 2 (two) times daily. 60 g 0    coenzyme Q10 (CO Q-10) 100 mg capsule Take 100 mg by mouth once daily.      fish oil-omega-3 fatty acids 300-1,000 mg capsule Take 2 g by mouth.      glucosamine-chondroitin 500-400 mg tablet Take by mouth 2 (two) times daily.       ibuprofen-famotidine 800-26.6 mg Tab Take 1 tablet by mouth 3 (three) times daily.         Objective Findings:    Vital Signs:There were no vitals filed for this visit.        Physical Exam:  General Appearance: Well appearing in no acute distress  Eyes:    No scleral icterus  ENT: Neck supple, Lips, mucosa, and tongue normal; teeth and gums normal  Lungs: CTA bilaterally in anterior and posterior fields, no wheezes, no crackles.  Heart:  Regular rate, S1, S2 normal, no murmurs heard.  Abdomen: Soft, non tender, non distended with normal bowel sounds. No hepatosplenomegaly, ascites, or mass.  Extremities: No clubbing, cyanosis or edema  Skin: No rash    Labs:  Reviewed    Plan:Colonoscopy  I have explained the risks and benefits of endoscopy procedures to the patient including but not limited to bleeding, perforation, infection, and death. The patient wishes to proceed.

## 2018-08-03 NOTE — [employer] PATIENT INSTRUCTIONS
Discharge Summary/Instructions after an Endoscopic Procedure  Patient Name: Olga Alva  Patient MRN: [id number]  Patient YOB: 1970  Friday, August 03, 2018 Mauricio Brown III, MD  RESTRICTIONS:  During your procedure today, you received medications for sedation.  These   medications may affect your judgment, balance and coordination.  Therefore,   for 24 hours, you have the following restrictions:   - DO NOT drive a car, operate machinery, make legal/financial decisions,   sign important papers or drink alcohol.    ACTIVITY:  Today: no heavy lifting, straining or running due to procedural   sedation/anesthesia.  The following day: return to full activity including work.  DIET:  Eat and drink normally unless instructed otherwise.     TREATMENT FOR COMMON SIDE EFFECTS:  - Mild abdominal pain, nausea, belching, bloating or excessive gas:  rest,   eat lightly and use a heating pad.  - Sore Throat: treat with throat lozenges and/or gargle with warm salt   water.  - Because air was used during the procedure, expelling large amounts of air   from your rectum or belching is normal.  - If a bowel prep was taken, you may not have a bowel movement for 1-3 days.    This is normal.  SYMPTOMS TO WATCH FOR AND REPORT TO YOUR PHYSICIAN:  1. Abdominal pain or bloating, other than gas cramps.  2. Chest pain.  3. Back pain.  4. Signs of infection such as: chills or fever occurring within 24 hours   after the procedure.  5. Rectal bleeding, which would show as bright red, maroon, or black stools.   (A tablespoon of blood from the rectum is not serious, especially if   hemorrhoids are present.)  6. Vomiting.  7. Weakness or dizziness.  GO DIRECTLY TO THE NEAREST EMERGENCY ROOM IF YOU HAVE ANY OF THE FOLLOWING:      Difficulty breathing              Chills and/or fever over 101 F   Persistent vomiting and/or vomiting blood   Severe abdominal pain   Severe chest pain   Black, tarry stools   Bleeding- more than one  tablespoon   Any other symptom or condition that you feel may need urgent attention  Your doctor recommends these additional instructions:  If any biopsies were taken, your doctors clinic will contact you in 1 to 2   weeks with any results.  - Discharge patient to home (via wheelchair).   - High fiber diet.   - Continue present medications.   - Repeat colonoscopy in 10 years for screening purposes.   - Return to primary care physician as previously scheduled.   - Discharge patient to home (via wheelchair).   - High fiber diet.   - Continue present medications.   - Repeat colonoscopy in 10 years for screening purposes.   - Return to primary care physician as previously scheduled.  For questions, problems or results please call your physician Mauricio Brown III, MD at Work:  [phone]  If you have any questions about the above instructions, call the GI   department at [phone] or call the endoscopy unit at [phone]   from 7am until 3 pm.  [facility] [place], EMERGENCY ROOM PHONE NUMBER:   [phone]  IF A COMPLICATION OR EMERGENCY SITUATION ARISES AND YOU ARE UNABLE TO REACH   YOUR PHYSICIAN - GO DIRECTLY TO THE EMERGENCY ROOM.  I have read or have had read to me these discharge instructions for my   procedure and have received a written copy.  I understand these   instructions and will follow-up with my physician if I have any questions.     __________________________________       _____________________________________  Nurse Signature                                          Patient/Designated   Responsible Party Signature  Mauricio Brown III, MD  8/3/2018 7:38:20 AM  This report has been verified and signed electronically.  [employer]

## 2018-08-03 NOTE — PLAN OF CARE
Dr Brown came to bedside and discussed findings. NO N/V,  no abdominal pain, no GI bleeding, and vitals stable.  Pt discharged from unit.

## 2018-08-03 NOTE — INTERVAL H&P NOTE
The patient has been examined and the H&P has been reviewed:I have reviewed this note and I agree with this assessment. The patient remains stable for endoscopy at the time of this present evaluation. GH      Anesthesia/Surgery risks, benefits and alternative options discussed and understood by patient/family.          Active Hospital Problems    Diagnosis  POA    Colon cancer screening [Z12.11]  Not Applicable      Resolved Hospital Problems    Diagnosis Date Resolved POA   No resolved problems to display.

## 2018-08-03 NOTE — ANESTHESIA POSTPROCEDURE EVALUATION
"Anesthesia Post Evaluation    Patient: Olga Alva    Procedure(s) Performed: Procedure(s) (LRB):  COLONOSCOPY (N/A)    Final Anesthesia Type: MAC  Patient location during evaluation: PACU  Patient participation: Yes- Able to Participate  Level of consciousness: awake and alert and oriented  Post-procedure vital signs: reviewed and stable  Pain management: adequate  Airway patency: patent  PONV status at discharge: No PONV  Anesthetic complications: no      Cardiovascular status: blood pressure returned to baseline  Respiratory status: unassisted, room air and spontaneous ventilation  Hydration status: euvolemic  Follow-up not needed.        Visit Vitals  /82 (BP Location: Left arm, Patient Position: Lying)   Pulse 69   Temp 36.7 °C (98.1 °F) (Oral)   Resp 18   Ht 5' 3" (1.6 m)   Wt 76.7 kg (169 lb)   LMP 06/22/2016   SpO2 97%   Breastfeeding? No   BMI 29.94 kg/m²       Pain/Mary Score: Pain Assessment Performed: Yes (8/3/2018  6:38 AM)  Presence of Pain: denies (8/3/2018  6:38 AM)      "

## 2018-08-03 NOTE — ANESTHESIA PREPROCEDURE EVALUATION
08/03/2018  Olga Alva is a 48 y.o., female.    Anesthesia Evaluation    I have reviewed the Patient Summary Reports.    I have reviewed the Nursing Notes.   I have reviewed the Medications.     Review of Systems  Anesthesia Hx:  No problems with previous Anesthesia    Social:  Non-Smoker, Social Alcohol Use    Hematology/Oncology:  Hematology Normal       -- Cancer in past history:  Oncology Comments: Skin cancer     EENT/Dental:EENT/Dental Normal   Cardiovascular:   Exercise tolerance: good hyperlipidemia ECG has been reviewed. Sinus bradycardia  Otherwise normal ECG  When compared with ECG of 04-APR-2013 08:16,  No significant change was found  Confirmed by JENY DOMINGO MD ([id number]) on 8/30/2017 9:34:53 PM   Pulmonary:  Pulmonary Normal    Renal/:  Renal/ Normal     Hepatic/GI:   Bowel Prep. 0400 last drink of fluid.  Wednesday at 1900 last solid meal.   Musculoskeletal:   Arthritis     Neurological:  Neurology Normal    Endocrine:  Endocrine Normal    Psych:   Psychiatric History          Physical Exam  General:  Well nourished    Airway/Jaw/Neck:  Airway Findings: Mallampati: II                Anesthesia Plan  Type of Anesthesia, risks & benefits discussed:  Anesthesia Type:  MAC  Patient's Preference:   Intra-op Monitoring Plan:   Intra-op Monitoring Plan Comments:   Post Op Pain Control Plan:   Post Op Pain Control Plan Comments:   Induction:   IV  Beta Blocker:  Patient is not currently on a Beta-Blocker (No further documentation required).       Informed Consent: Patient understands risks and agrees with Anesthesia plan.  Questions answered. Anesthesia consent signed with patient.  ASA Score: 2     Day of Surgery Review of History & Physical: I have interviewed and examined the patient. I have reviewed the patient's H&P dated: 08/03/18. There are no significant changes.          Ready For  Surgery From Anesthesia Perspective.

## 2018-08-03 NOTE — DISCHARGE INSTRUCTIONS
Colonoscopy     A camera attached to a flexible tube with a viewing lens is used to take video pictures.     Colonoscopy is a test to view the inside of your lower digestive tract (colon and rectum). Sometimes it can show the last part of the small intestine (ileum). During the test, small pieces of tissue may be removed for testing. This is called a biopsy. Small growths, such as polyps, may also be removed.   Why is colonoscopy done?  The test is done to help look for colon cancer. And it can help find the source of abdominal pain, bleeding, and changes in bowel habits. It may be needed once a year, depending on factors such as your:  · Age  · Health history  · Family health history  · Symptoms  · Results from any prior colonoscopy  Risks and possible complications  These include:  · Bleeding               · A puncture or tear in the colon   · Risks of anesthesia  · A cancer lesion not being seen  Getting ready   To prepare for the test:  · Talk with your healthcare provider about the risks of the test (see below). Also ask your healthcare provider about alternatives to the test.  · Tell your healthcare provider about any medicines you take. Also tell him or her about any health conditions you may have.  · Make sure your rectum and colon are empty for the test. Follow the diet and bowel prep instructions exactly. If you dont, the test may need to be rescheduled.  · Plan for a friend or family member to drive you home after the test.     Colonoscopy provides an inside view of the entire colon.     You may discuss the results with your doctor right away or at a future visit.  During the test   The test is usually done in the hospital on an outpatient basis. This means you go home the same day. The procedure takes about 30 minutes. During that time:  · You are given relaxing (sedating) medicine through an IV line. You may be drowsy, or fully asleep.  · The healthcare provider will first give you a physical exam to  check for anal and rectal problems.  · Then the anus is lubricated and the scope inserted.  · If you are awake, you may have a feeling similar to needing to have a bowel movement. You may also feel pressure as air is pumped into the colon. Its OK to pass gas during the procedure.  · Biopsy, polyp removal, or other treatments may be done during the test.  After the test   You may have gas right after the test. It can help to try to pass it to help prevent later bloating. Your healthcare provider may discuss the results with you right away. Or you may need to schedule a follow-up visit to talk about the results. After the test, you can go back to your normal eating and other activities. You may be tired from the sedation and need to rest for a few hours.  Date Last Reviewed: 11/1/2016 © 2000-2017 The "[employer]. [address] [place] [address], [place] [address]. All rights reserved. This information is not intended as a substitute for professional medical care. Always follow your healthcare professional's instructions.

## 2018-08-03 NOTE — TRANSFER OF CARE
"Anesthesia Transfer of Care Note    Patient: Olga Alva    Procedure(s) Performed: Procedure(s) (LRB):  COLONOSCOPY (N/A)    Patient location: PACU    Anesthesia Type: MAC    Transport from OR: Transported from OR on room air with adequate spontaneous ventilation    Post pain: adequate analgesia    Post assessment: no apparent anesthetic complications    Post vital signs: stable    Level of consciousness: sedated    Nausea/Vomiting: no nausea/vomiting    Complications: none    Transfer of care protocol was followed      Last vitals:   Visit Vitals  /82 (BP Location: Left arm, Patient Position: Lying)   Pulse 69   Temp 36.7 °C (98.1 °F) (Oral)   Resp 18   Ht 5' 3" (1.6 m)   Wt 76.7 kg (169 lb)   LMP 06/22/2016   SpO2 97%   Breastfeeding? No   BMI 29.94 kg/m²     "

## 2018-09-07 ENCOUNTER — LAB VISIT (OUTPATIENT)
Dept: LAB | Facility: HOSPITAL | Age: 48
End: 2018-09-07
Attending: PEDIATRICS
Payer: COMMERCIAL

## 2018-09-07 DIAGNOSIS — E78.5 DYSLIPIDEMIA: ICD-10-CM

## 2018-09-07 LAB
ALT SERPL W/O P-5'-P-CCNC: 18 U/L
ANION GAP SERPL CALC-SCNC: 7 MMOL/L
AST SERPL-CCNC: 18 U/L
BUN SERPL-MCNC: 15 MG/DL
CALCIUM SERPL-MCNC: 9.6 MG/DL
CHLORIDE SERPL-SCNC: 107 MMOL/L
CHOLEST SERPL-MCNC: 132 MG/DL
CHOLEST/HDLC SERPL: 3.1 {RATIO}
CO2 SERPL-SCNC: 27 MMOL/L
CREAT SERPL-MCNC: 0.9 MG/DL
EST. GFR  (AFRICAN AMERICAN): >60 ML/MIN/1.73 M^2
EST. GFR  (NON AFRICAN AMERICAN): >60 ML/MIN/1.73 M^2
GLUCOSE SERPL-MCNC: 93 MG/DL
HDLC SERPL-MCNC: 43 MG/DL
HDLC SERPL: 32.6 %
LDLC SERPL CALC-MCNC: 70.8 MG/DL
NONHDLC SERPL-MCNC: 89 MG/DL
POTASSIUM SERPL-SCNC: 4.5 MMOL/L
SODIUM SERPL-SCNC: 141 MMOL/L
TRIGL SERPL-MCNC: 91 MG/DL

## 2018-09-07 PROCEDURE — 36415 COLL VENOUS BLD VENIPUNCTURE: CPT | Mod: PO

## 2018-09-07 PROCEDURE — 84460 ALANINE AMINO (ALT) (SGPT): CPT

## 2018-09-07 PROCEDURE — 80061 LIPID PANEL: CPT

## 2018-09-07 PROCEDURE — 84450 TRANSFERASE (AST) (SGOT): CPT

## 2018-09-07 PROCEDURE — 80048 BASIC METABOLIC PNL TOTAL CA: CPT

## 2018-09-13 ENCOUNTER — PATIENT MESSAGE (OUTPATIENT)
Dept: INTERNAL MEDICINE | Facility: CLINIC | Age: 48
End: 2018-09-13

## 2018-09-13 DIAGNOSIS — E78.5 DYSLIPIDEMIA: Primary | ICD-10-CM

## 2019-01-18 ENCOUNTER — PATIENT MESSAGE (OUTPATIENT)
Dept: INTERNAL MEDICINE | Facility: CLINIC | Age: 49
End: 2019-01-18

## 2019-03-13 ENCOUNTER — PATIENT MESSAGE (OUTPATIENT)
Dept: INTERNAL MEDICINE | Facility: CLINIC | Age: 49
End: 2019-03-13

## 2019-03-26 ENCOUNTER — LAB VISIT (OUTPATIENT)
Dept: LAB | Facility: HOSPITAL | Age: 49
End: 2019-03-26
Attending: PEDIATRICS
Payer: COMMERCIAL

## 2019-03-26 ENCOUNTER — OFFICE VISIT (OUTPATIENT)
Dept: INTERNAL MEDICINE | Facility: CLINIC | Age: 49
End: 2019-03-26
Payer: COMMERCIAL

## 2019-03-26 VITALS
TEMPERATURE: 99 F | OXYGEN SATURATION: 98 % | WEIGHT: 176.38 LBS | BODY MASS INDEX: 31.25 KG/M2 | DIASTOLIC BLOOD PRESSURE: 84 MMHG | SYSTOLIC BLOOD PRESSURE: 130 MMHG | RESPIRATION RATE: 16 BRPM | HEIGHT: 63 IN | HEART RATE: 76 BPM

## 2019-03-26 DIAGNOSIS — E78.5 DYSLIPIDEMIA: ICD-10-CM

## 2019-03-26 DIAGNOSIS — M17.0 PRIMARY OSTEOARTHRITIS OF BOTH KNEES: ICD-10-CM

## 2019-03-26 DIAGNOSIS — Z00.00 WELL ADULT EXAM: Primary | ICD-10-CM

## 2019-03-26 DIAGNOSIS — E66.9 OBESITY, CLASS I, BMI 30-34.9: ICD-10-CM

## 2019-03-26 PROCEDURE — 99999 PR PBB SHADOW E&M-EST. PATIENT-LVL III: CPT | Mod: PBBFAC,,, | Performed by: PEDIATRICS

## 2019-03-26 PROCEDURE — 80061 LIPID PANEL: CPT

## 2019-03-26 PROCEDURE — 80048 BASIC METABOLIC PNL TOTAL CA: CPT

## 2019-03-26 PROCEDURE — 84450 TRANSFERASE (AST) (SGOT): CPT

## 2019-03-26 PROCEDURE — 84460 ALANINE AMINO (ALT) (SGPT): CPT

## 2019-03-26 PROCEDURE — 99396 PR PREVENTIVE VISIT,EST,40-64: ICD-10-PCS | Mod: S$GLB,,, | Performed by: PEDIATRICS

## 2019-03-26 PROCEDURE — 99999 PR PBB SHADOW E&M-EST. PATIENT-LVL III: ICD-10-PCS | Mod: PBBFAC,,, | Performed by: PEDIATRICS

## 2019-03-26 PROCEDURE — 99396 PREV VISIT EST AGE 40-64: CPT | Mod: S$GLB,,, | Performed by: PEDIATRICS

## 2019-03-26 PROCEDURE — 36415 COLL VENOUS BLD VENIPUNCTURE: CPT

## 2019-03-26 RX ORDER — TURMERIC/TURMERIC EXT/PEPR EXT 900-100 MG
1 CAPSULE ORAL DAILY
COMMUNITY
End: 2023-08-31
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
## 2019-03-27 LAB
ALT SERPL W/O P-5'-P-CCNC: 26 U/L (ref 10–44)
ANION GAP SERPL CALC-SCNC: 9 MMOL/L (ref 8–16)
AST SERPL-CCNC: 28 U/L (ref 10–40)
BUN SERPL-MCNC: 15 MG/DL (ref 6–20)
CALCIUM SERPL-MCNC: 10 MG/DL (ref 8.7–10.5)
CHLORIDE SERPL-SCNC: 104 MMOL/L (ref 95–110)
CHOLEST SERPL-MCNC: 151 MG/DL (ref 120–199)
CHOLEST/HDLC SERPL: 3.4 {RATIO} (ref 2–5)
CO2 SERPL-SCNC: 27 MMOL/L (ref 23–29)
CREAT SERPL-MCNC: 1 MG/DL (ref 0.5–1.4)
EST. GFR  (AFRICAN AMERICAN): >60 ML/MIN/1.73 M^2
EST. GFR  (NON AFRICAN AMERICAN): >60 ML/MIN/1.73 M^2
GLUCOSE SERPL-MCNC: 82 MG/DL (ref 70–110)
HDLC SERPL-MCNC: 45 MG/DL (ref 40–75)
HDLC SERPL: 29.8 % (ref 20–50)
LDLC SERPL CALC-MCNC: 82.4 MG/DL (ref 63–159)
NONHDLC SERPL-MCNC: 106 MG/DL
POTASSIUM SERPL-SCNC: 4.9 MMOL/L (ref 3.5–5.1)
SODIUM SERPL-SCNC: 140 MMOL/L (ref 136–145)
TRIGL SERPL-MCNC: 118 MG/DL (ref 30–150)

## 2019-03-28 ENCOUNTER — TELEPHONE (OUTPATIENT)
Dept: INTERNAL MEDICINE | Facility: CLINIC | Age: 49
End: 2019-03-28

## 2019-03-28 NOTE — TELEPHONE ENCOUNTER
Faxed MD completed wellness form to [employer], fax transmission confirmed F#[phone]. Pt informed, per pt request mail her copy of form to confirmed mailing address.

## 2019-07-03 DIAGNOSIS — E78.5 DYSLIPIDEMIA: ICD-10-CM

## 2019-07-03 RX ORDER — ATORVASTATIN CALCIUM 10 MG/1
TABLET, FILM COATED ORAL
Qty: 90 TABLET | Refills: 3 | Status: SHIPPED | OUTPATIENT
Start: 2019-07-03 | End: 2020-07-05

## 2019-09-17 ENCOUNTER — LAB VISIT (OUTPATIENT)
Dept: LAB | Facility: HOSPITAL | Age: 49
End: 2019-09-17
Attending: PEDIATRICS
Payer: COMMERCIAL

## 2019-09-17 DIAGNOSIS — E78.5 DYSLIPIDEMIA: ICD-10-CM

## 2019-09-17 LAB
ALT SERPL W/O P-5'-P-CCNC: 21 U/L (ref 10–44)
AST SERPL-CCNC: 21 U/L (ref 10–40)
CHOLEST SERPL-MCNC: 146 MG/DL (ref 120–199)
CHOLEST/HDLC SERPL: 3.5 {RATIO} (ref 2–5)
HDLC SERPL-MCNC: 42 MG/DL (ref 40–75)
HDLC SERPL: 28.8 % (ref 20–50)
LDLC SERPL CALC-MCNC: 90.2 MG/DL (ref 63–159)
NONHDLC SERPL-MCNC: 104 MG/DL
TRIGL SERPL-MCNC: 69 MG/DL (ref 30–150)

## 2019-09-17 PROCEDURE — 80061 LIPID PANEL: CPT

## 2019-09-17 PROCEDURE — 84460 ALANINE AMINO (ALT) (SGPT): CPT

## 2019-09-17 PROCEDURE — 84450 TRANSFERASE (AST) (SGOT): CPT

## 2019-09-17 PROCEDURE — 36415 COLL VENOUS BLD VENIPUNCTURE: CPT

## 2019-09-24 ENCOUNTER — OFFICE VISIT (OUTPATIENT)
Dept: INTERNAL MEDICINE | Facility: CLINIC | Age: 49
End: 2019-09-24
Payer: COMMERCIAL

## 2019-09-24 VITALS
DIASTOLIC BLOOD PRESSURE: 82 MMHG | OXYGEN SATURATION: 97 % | BODY MASS INDEX: 30.46 KG/M2 | WEIGHT: 171.94 LBS | HEART RATE: 78 BPM | SYSTOLIC BLOOD PRESSURE: 132 MMHG | HEIGHT: 63 IN | TEMPERATURE: 99 F | RESPIRATION RATE: 16 BRPM

## 2019-09-24 DIAGNOSIS — E78.5 DYSLIPIDEMIA: Primary | ICD-10-CM

## 2019-09-24 PROCEDURE — 99214 OFFICE O/P EST MOD 30 MIN: CPT | Mod: S$GLB,,, | Performed by: PEDIATRICS

## 2019-09-24 PROCEDURE — 3008F PR BODY MASS INDEX (BMI) DOCUMENTED: ICD-10-PCS | Mod: CPTII,S$GLB,, | Performed by: PEDIATRICS

## 2019-09-24 PROCEDURE — 99999 PR PBB SHADOW E&M-EST. PATIENT-LVL III: CPT | Mod: PBBFAC,,, | Performed by: PEDIATRICS

## 2019-09-24 PROCEDURE — 99214 PR OFFICE/OUTPT VISIT, EST, LEVL IV, 30-39 MIN: ICD-10-PCS | Mod: S$GLB,,, | Performed by: PEDIATRICS

## 2019-09-24 PROCEDURE — 99999 PR PBB SHADOW E&M-EST. PATIENT-LVL III: ICD-10-PCS | Mod: PBBFAC,,, | Performed by: PEDIATRICS

## 2019-09-24 PROCEDURE — 3008F BODY MASS INDEX DOCD: CPT | Mod: CPTII,S$GLB,, | Performed by: PEDIATRICS

## 2019-09-24 NOTE — PROGRESS NOTES
Subjective:       Patient ID: Olga Alva is a 49 y.o. female.    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    LABS REVIEWED AND DISCUSSED WITH PATIENT    Review of Systems   Constitutional: Negative for fever and unexpected weight change.   HENT: Negative for congestion and rhinorrhea.    Eyes: Negative for discharge and redness.   Respiratory: Negative for cough and wheezing.    Cardiovascular: Negative for chest pain, palpitations and leg swelling.   Gastrointestinal: Negative for constipation, diarrhea and vomiting.   Endocrine: Negative for polydipsia, polyphagia and polyuria.   Genitourinary: Negative for decreased urine volume, difficulty urinating and menstrual problem (s/p hysterectomy).   Musculoskeletal: Negative for arthralgias and joint swelling.   Skin: Negative for rash and wound.   Neurological: Negative for syncope and headaches.   Psychiatric/Behavioral: Negative for behavioral problems and sleep disturbance.       Objective:      Physical Exam   Constitutional: She is oriented to person, place, and time. She appears well-developed and well-nourished. No distress.   Neck: No JVD present. No thyromegaly present.   Cardiovascular: Normal rate, regular rhythm and normal heart sounds.   No murmur heard.  Pulmonary/Chest: Effort normal and breath sounds normal. No respiratory distress. She has no wheezes. She has no rales.   Abdominal: Soft. She exhibits no distension and no mass. There is no tenderness. There is no guarding.   Musculoskeletal: She exhibits no edema.   Lymphadenopathy:     She has no cervical adenopathy.   Neurological: She is alert and oriented to person, place, and time. No cranial nerve deficit. Coordination normal.   Skin: Capillary refill takes less than 2 seconds. No rash noted.   Psychiatric: She has a normal mood and affect. Her behavior is normal. Judgment and thought content normal.       Assessment:        1. Dyslipidemia        Plan:       Dyslipidemia  -     Lipid panel; Future; Expected date: 09/24/2019  -     Basic metabolic panel; Future; Expected date: 09/24/2019  -     AST (SGOT); Future; Expected date: 09/24/2019  -     ALT (SGPT); Future; Expected date: 09/24/2019    Maintain lipitor, D&E, weight loss. She is at goal. F/U yearly with labs, she decline flu vaccine.

## 2019-10-27 ENCOUNTER — PATIENT MESSAGE (OUTPATIENT)
Dept: INTERNAL MEDICINE | Facility: CLINIC | Age: 49
End: 2019-10-27

## 2020-01-31 DIAGNOSIS — Z12.39 BREAST CANCER SCREENING: ICD-10-CM

## 2020-02-04 LAB — HEMOCCULT STL QL IA: NEGATIVE

## 2020-02-21 DIAGNOSIS — Z71.9 HEALTH EDUCATION/COUNSELING: Primary | ICD-10-CM

## 2020-02-27 ENCOUNTER — NUTRITION (OUTPATIENT)
Dept: NUTRITION | Facility: CLINIC | Age: 50
End: 2020-02-27
Payer: COMMERCIAL

## 2020-02-27 DIAGNOSIS — Z71.9 HEALTH EDUCATION/COUNSELING: ICD-10-CM

## 2020-02-27 LAB
ALBUMIN SERPL BCP-MCNC: 4.2 G/DL (ref 3.5–5.2)
ALP SERPL-CCNC: 81 U/L (ref 55–135)
ALT SERPL W/O P-5'-P-CCNC: 28 U/L (ref 10–44)
ANION GAP SERPL CALC-SCNC: 9 MMOL/L (ref 8–16)
AST SERPL-CCNC: 29 U/L (ref 10–40)
BILIRUB SERPL-MCNC: 0.4 MG/DL (ref 0.1–1)
BUN SERPL-MCNC: 17 MG/DL (ref 6–20)
CALCIUM SERPL-MCNC: 10.2 MG/DL (ref 8.7–10.5)
CHLORIDE SERPL-SCNC: 105 MMOL/L (ref 95–110)
CHOLEST SERPL-MCNC: 171 MG/DL (ref 120–199)
CHOLEST/HDLC SERPL: 3.6 {RATIO} (ref 2–5)
CO2 SERPL-SCNC: 27 MMOL/L (ref 23–29)
CREAT SERPL-MCNC: 0.9 MG/DL (ref 0.5–1.4)
CRP SERPL-MCNC: 2.03 MG/L (ref 0–3.19)
ERYTHROCYTE [DISTWIDTH] IN BLOOD BY AUTOMATED COUNT: 13.1 % (ref 11.5–14.5)
EST. GFR  (AFRICAN AMERICAN): >60 ML/MIN/1.73 M^2
EST. GFR  (NON AFRICAN AMERICAN): >60 ML/MIN/1.73 M^2
GGT SERPL-CCNC: 18 U/L (ref 8–55)
GLUCOSE SERPL-MCNC: 89 MG/DL (ref 70–110)
HCT VFR BLD AUTO: 44.4 % (ref 37–48.5)
HDLC SERPL-MCNC: 48 MG/DL (ref 40–75)
HDLC SERPL: 28.1 % (ref 20–50)
HGB BLD-MCNC: 14.2 G/DL (ref 12–16)
LDLC SERPL CALC-MCNC: 92.6 MG/DL (ref 63–159)
MCH RBC QN AUTO: 28.9 PG (ref 27–31)
MCHC RBC AUTO-ENTMCNC: 32 G/DL (ref 32–36)
MCV RBC AUTO: 90 FL (ref 82–98)
NONHDLC SERPL-MCNC: 123 MG/DL
PLATELET # BLD AUTO: 275 K/UL (ref 150–350)
PMV BLD AUTO: 10.6 FL (ref 9.2–12.9)
POTASSIUM SERPL-SCNC: 4.9 MMOL/L (ref 3.5–5.1)
PROT SERPL-MCNC: 8.1 G/DL (ref 6–8.4)
RBC # BLD AUTO: 4.91 M/UL (ref 4–5.4)
SODIUM SERPL-SCNC: 141 MMOL/L (ref 136–145)
TRIGL SERPL-MCNC: 152 MG/DL (ref 30–150)
WBC # BLD AUTO: 6.72 K/UL (ref 3.9–12.7)

## 2020-02-27 PROCEDURE — 36415 COLL VENOUS BLD VENIPUNCTURE: CPT

## 2020-02-28 ENCOUNTER — PATIENT MESSAGE (OUTPATIENT)
Dept: ADMINISTRATIVE | Facility: OTHER | Age: 50
End: 2020-02-28

## 2020-02-28 LAB
ERYTHROCYTE [SEDIMENTATION RATE] IN BLOOD BY WESTERGREN METHOD: <2 MM/HR (ref 0–36)
FOLATE SERPL-MCNC: 11 NG/ML (ref 4–24)
VIT B12 SERPL-MCNC: 277 PG/ML (ref 210–950)

## 2020-05-08 ENCOUNTER — PATIENT OUTREACH (OUTPATIENT)
Dept: ADMINISTRATIVE | Facility: HOSPITAL | Age: 50
End: 2020-05-08

## 2020-05-08 NOTE — PROGRESS NOTES
Pt showing overdue for mammogram. Attempted to call to verify/schedule. No answer. Sent portal message.  *KDL*

## 2020-07-07 ENCOUNTER — PATIENT MESSAGE (OUTPATIENT)
Dept: INTERNAL MEDICINE | Facility: CLINIC | Age: 50
End: 2020-07-07

## 2020-07-07 DIAGNOSIS — M19.049 HAND ARTHRITIS: Primary | ICD-10-CM

## 2020-07-10 DIAGNOSIS — E78.5 DYSLIPIDEMIA: ICD-10-CM

## 2020-07-10 NOTE — TELEPHONE ENCOUNTER
Fax refill request rec'vd from pt's mail-order pharm, sent to Dr. Olivera for auth [Atorvastatin].    [facility] 09/24/19  [place] 09/24/20    Last lipid panel 02/27/20, repeat sched 09/17/20.

## 2020-07-11 RX ORDER — ATORVASTATIN CALCIUM 10 MG/1
TABLET, FILM COATED ORAL
Qty: 90 TABLET | Refills: 3 | Status: SHIPPED | OUTPATIENT
Start: 2020-07-11 | End: 2021-08-10

## 2020-07-28 NOTE — TELEPHONE ENCOUNTER
Ordered xray and blood work for hand arthritis complaints. Possible rheumatology referral after results back. Patient may come in for lab/xray only.

## 2020-07-31 ENCOUNTER — LAB VISIT (OUTPATIENT)
Dept: LAB | Facility: HOSPITAL | Age: 50
End: 2020-07-31
Attending: PEDIATRICS
Payer: COMMERCIAL

## 2020-07-31 ENCOUNTER — HOSPITAL ENCOUNTER (OUTPATIENT)
Dept: RADIOLOGY | Facility: HOSPITAL | Age: 50
Discharge: HOME | End: 2020-07-31
Attending: PEDIATRICS
Payer: COMMERCIAL

## 2020-07-31 DIAGNOSIS — M19.049 HAND ARTHRITIS: ICD-10-CM

## 2020-07-31 LAB
ERYTHROCYTE [SEDIMENTATION RATE] IN BLOOD BY WESTERGREN METHOD: 6 MM/HR (ref 0–36)
RHEUMATOID FACT SERPL-ACNC: <10 IU/ML (ref 0–15)

## 2020-07-31 PROCEDURE — 73130 X-RAY EXAM OF HAND: CPT | Mod: 26,,, | Performed by: RADIOLOGY

## 2020-07-31 PROCEDURE — 86431 RHEUMATOID FACTOR QUANT: CPT

## 2020-07-31 PROCEDURE — 36415 COLL VENOUS BLD VENIPUNCTURE: CPT

## 2020-07-31 PROCEDURE — 73130 XR HAND COMPLETE 3 VIEWS BILATERAL: ICD-10-PCS | Mod: 26,,, | Performed by: RADIOLOGY

## 2020-07-31 PROCEDURE — 86038 ANTINUCLEAR ANTIBODIES: CPT

## 2020-07-31 PROCEDURE — 73130 X-RAY EXAM OF HAND: CPT | Mod: TC,50

## 2020-07-31 PROCEDURE — 85652 RBC SED RATE AUTOMATED: CPT

## 2020-08-04 ENCOUNTER — PATIENT MESSAGE (OUTPATIENT)
Dept: INTERNAL MEDICINE | Facility: CLINIC | Age: 50
End: 2020-08-04

## 2020-08-04 ENCOUNTER — PATIENT OUTREACH (OUTPATIENT)
Dept: ADMINISTRATIVE | Facility: HOSPITAL | Age: 50
End: 2020-08-04

## 2020-08-04 LAB — ANA SER QL IF: NORMAL

## 2020-08-04 NOTE — LETTER
August 4, 2020      We are seeing Olga Alva, 1970, at [facility]. ROMEO Olivera Jr, MD is their primary care physician. To help with our [facility] maintenance records could you please send the following:     Mammogram   Please fax to [facility] at [phone], attention Maico Pepper.      Thank-you in advance for your assistance. If you have any questions or concerns please contact me at [phone]  .     Maico Pepper MA  Care Coordination Department  [facility]   [phone]

## 2020-08-04 NOTE — PROGRESS NOTES
Pt called back and states she had mammogram completed at [facility]. I have faxed request to [facility] for mammogram.

## 2020-08-05 ENCOUNTER — PATIENT MESSAGE (OUTPATIENT)
Dept: INTERNAL MEDICINE | Facility: CLINIC | Age: 50
End: 2020-08-05

## 2020-08-05 NOTE — PROGRESS NOTES
Received fax from [facility], patient has no recent mammograms. Last date of mammogram performed was 01/2/2005.

## 2020-08-18 DIAGNOSIS — E78.5 DYSLIPIDEMIA: ICD-10-CM

## 2020-08-18 RX ORDER — ATORVASTATIN CALCIUM 10 MG/1
TABLET, FILM COATED ORAL
Qty: 90 TABLET | Refills: 3 | Status: CANCELLED | OUTPATIENT
Start: 2020-08-18

## 2020-08-19 NOTE — TELEPHONE ENCOUNTER
See mychart refill request, pt advised refill already sent to [facility] on 07/11/20.    Date/Time Signed: 7/11/2020 11:41       E-Prescribing Status: Receipt confirmed by pharmacy (7/11/2020 11:41 AM CDT)

## 2020-09-17 ENCOUNTER — LAB VISIT (OUTPATIENT)
Dept: LAB | Facility: HOSPITAL | Age: 50
End: 2020-09-17
Attending: PEDIATRICS
Payer: COMMERCIAL

## 2020-09-17 DIAGNOSIS — E78.5 DYSLIPIDEMIA: ICD-10-CM

## 2020-09-17 LAB
ALT SERPL W/O P-5'-P-CCNC: 23 U/L (ref 10–44)
ANION GAP SERPL CALC-SCNC: 7 MMOL/L (ref 8–16)
AST SERPL-CCNC: 26 U/L (ref 10–40)
BUN SERPL-MCNC: 14 MG/DL (ref 6–20)
CALCIUM SERPL-MCNC: 9.7 MG/DL (ref 8.7–10.5)
CHLORIDE SERPL-SCNC: 104 MMOL/L (ref 95–110)
CHOLEST SERPL-MCNC: 229 MG/DL (ref 120–199)
CHOLEST/HDLC SERPL: 4.6 {RATIO} (ref 2–5)
CO2 SERPL-SCNC: 30 MMOL/L (ref 23–29)
CREAT SERPL-MCNC: 0.8 MG/DL (ref 0.5–1.4)
EST. GFR  (AFRICAN AMERICAN): >60 ML/MIN/1.73 M^2
EST. GFR  (NON AFRICAN AMERICAN): >60 ML/MIN/1.73 M^2
GLUCOSE SERPL-MCNC: 99 MG/DL (ref 70–110)
HDLC SERPL-MCNC: 50 MG/DL (ref 40–75)
HDLC SERPL: 21.8 % (ref 20–50)
LDLC SERPL CALC-MCNC: 159.2 MG/DL (ref 63–159)
NONHDLC SERPL-MCNC: 179 MG/DL
POTASSIUM SERPL-SCNC: 4.3 MMOL/L (ref 3.5–5.1)
SODIUM SERPL-SCNC: 141 MMOL/L (ref 136–145)
TRIGL SERPL-MCNC: 99 MG/DL (ref 30–150)

## 2020-09-17 PROCEDURE — 80048 BASIC METABOLIC PNL TOTAL CA: CPT

## 2020-09-17 PROCEDURE — 84460 ALANINE AMINO (ALT) (SGPT): CPT

## 2020-09-17 PROCEDURE — 36415 COLL VENOUS BLD VENIPUNCTURE: CPT

## 2020-09-17 PROCEDURE — 84450 TRANSFERASE (AST) (SGOT): CPT

## 2020-09-17 PROCEDURE — 80061 LIPID PANEL: CPT

## 2020-09-21 ENCOUNTER — PATIENT MESSAGE (OUTPATIENT)
Dept: INTERNAL MEDICINE | Facility: CLINIC | Age: 50
End: 2020-09-21

## 2020-09-22 ENCOUNTER — PATIENT MESSAGE (OUTPATIENT)
Dept: INTERNAL MEDICINE | Facility: CLINIC | Age: 50
End: 2020-09-22

## 2020-09-23 ENCOUNTER — PATIENT MESSAGE (OUTPATIENT)
Dept: INTERNAL MEDICINE | Facility: CLINIC | Age: 50
End: 2020-09-23

## 2020-09-24 ENCOUNTER — OFFICE VISIT (OUTPATIENT)
Dept: INTERNAL MEDICINE | Facility: CLINIC | Age: 50
End: 2020-09-24
Payer: COMMERCIAL

## 2020-09-24 VITALS
SYSTOLIC BLOOD PRESSURE: 126 MMHG | BODY MASS INDEX: 31.02 KG/M2 | RESPIRATION RATE: 16 BRPM | WEIGHT: 175.06 LBS | OXYGEN SATURATION: 97 % | DIASTOLIC BLOOD PRESSURE: 88 MMHG | HEIGHT: 63 IN | HEART RATE: 67 BPM | TEMPERATURE: 98 F

## 2020-09-24 DIAGNOSIS — Z11.59 ENCOUNTER FOR HEPATITIS C SCREENING TEST FOR LOW RISK PATIENT: ICD-10-CM

## 2020-09-24 DIAGNOSIS — Z00.00 WELL ADULT EXAM: Primary | ICD-10-CM

## 2020-09-24 DIAGNOSIS — E66.9 OBESITY, CLASS I, BMI 30-34.9: ICD-10-CM

## 2020-09-24 DIAGNOSIS — E78.5 DYSLIPIDEMIA: ICD-10-CM

## 2020-09-24 DIAGNOSIS — M17.0 PRIMARY OSTEOARTHRITIS OF BOTH KNEES: ICD-10-CM

## 2020-09-24 PROCEDURE — 99999 PR PBB SHADOW E&M-EST. PATIENT-LVL IV: ICD-10-PCS | Mod: PBBFAC,,, | Performed by: PEDIATRICS

## 2020-09-24 PROCEDURE — 99396 PR PREVENTIVE VISIT,EST,40-64: ICD-10-PCS | Mod: S$GLB,,, | Performed by: PEDIATRICS

## 2020-09-24 PROCEDURE — 3008F BODY MASS INDEX DOCD: CPT | Mod: CPTII,S$GLB,, | Performed by: PEDIATRICS

## 2020-09-24 PROCEDURE — 3008F PR BODY MASS INDEX (BMI) DOCUMENTED: ICD-10-PCS | Mod: CPTII,S$GLB,, | Performed by: PEDIATRICS

## 2020-09-24 PROCEDURE — 99999 PR PBB SHADOW E&M-EST. PATIENT-LVL IV: CPT | Mod: PBBFAC,,, | Performed by: PEDIATRICS

## 2020-09-24 PROCEDURE — 99396 PREV VISIT EST AGE 40-64: CPT | Mod: S$GLB,,, | Performed by: PEDIATRICS

## 2020-09-24 NOTE — PROGRESS NOTES
Subjective:       Patient ID: Olga Alva is a 50 y.o. female.     Chief Complaint:Yearly and lipid check    PMH/PSH/SH/FH reviewed and updated   Lipids:Tolerating lipitor-but stopped prior to her CTS surgery 2 weeks ago, not as successful working at [employer], weight is up  DJD knees are stable, currently not causing trouble.     LABS REVIEWED AND DISCUSSED WITH PATIENT     Review of Systems   Constitutional: Negative for fever and unexpected weight change.   HENT: Negative for congestion and rhinorrhea.    Eyes: Negative for discharge and redness.   Respiratory: Negative for cough and wheezing.    Cardiovascular: Negative for chest pain, palpitations and leg swelling.   Gastrointestinal: Negative for constipation, diarrhea and vomiting.   Endocrine: Negative for polydipsia, polyphagia and polyuria.   Genitourinary: Negative for decreased urine volume, difficulty urinating and menstrual problem (s/p hysterectomy).   Musculoskeletal: Negative for arthralgias and joint swelling.   Skin: Negative for rash and wound.   Neurological: Negative for syncope and headaches.   Psychiatric/Behavioral: Negative for behavioral problems and sleep disturbance.       Objective:   Physical Exam   Constitutional: She is oriented to person, place, and time. She appears well-developed and well-nourished. No distress.   Neck: No JVD present. No thyromegaly present.   Cardiovascular: Normal rate, regular rhythm and normal heart sounds.   No murmur heard.  Pulmonary/Chest: Effort normal and breath sounds normal. No respiratory distress. She has no wheezes. She has no rales.   Abdominal: Soft. She exhibits no distension and no mass. There is no tenderness. There is no guarding.   Musculoskeletal: She exhibits no edema.   Lymphadenopathy:     She has no cervical adenopathy.   Neurological: She is alert and oriented to person, place, and time. No cranial nerve deficit. Coordination normal.   Skin: Capillary refill takes less than 2  seconds. No rash noted.   Psychiatric: She has a normal mood and affect. Her behavior is normal. Judgment and thought content normal.       Assessment:    wellness exam in a patient with    1. Dyslipidemia    2.     Obesity BMI 31  3.     Osteoarthritis knees    Plan:       Restart/Maintain lipitor, [employer], weight loss. She is at goal. JOANNA her GYN. F/U yearly with labs, she decline flu vaccine but will reconsider.

## 2020-10-05 ENCOUNTER — PATIENT OUTREACH (OUTPATIENT)
Dept: ADMINISTRATIVE | Facility: HOSPITAL | Age: 50
End: 2020-10-05

## 2020-10-05 NOTE — PROGRESS NOTES
Received most recent mammogram, pap smear and fecal occult blood scanned into chart today and sent to [facility].

## 2020-10-06 ENCOUNTER — PATIENT OUTREACH (OUTPATIENT)
Dept: ADMINISTRATIVE | Facility: HOSPITAL | Age: 50
End: 2020-10-06

## 2020-10-07 ENCOUNTER — PATIENT MESSAGE (OUTPATIENT)
Dept: INTERNAL MEDICINE | Facility: CLINIC | Age: 50
End: 2020-10-07

## 2020-10-27 ENCOUNTER — PATIENT MESSAGE (OUTPATIENT)
Dept: INTERNAL MEDICINE | Facility: CLINIC | Age: 50
End: 2020-10-27

## 2020-10-28 ENCOUNTER — PATIENT MESSAGE (OUTPATIENT)
Dept: INTERNAL MEDICINE | Facility: CLINIC | Age: 50
End: 2020-10-28

## 2020-10-28 NOTE — TELEPHONE ENCOUNTER
MD completed wellness form faxed to [employer]#[phone], copy emailed to pt: [email or web address]; form batched to chart.

## 2021-02-12 ENCOUNTER — PATIENT MESSAGE (OUTPATIENT)
Dept: ADMINISTRATIVE | Facility: HOSPITAL | Age: 51
End: 2021-02-12

## 2021-02-25 ENCOUNTER — PATIENT MESSAGE (OUTPATIENT)
Dept: INTERNAL MEDICINE | Facility: CLINIC | Age: 51
End: 2021-02-25

## 2021-02-25 ENCOUNTER — PATIENT MESSAGE (OUTPATIENT)
Dept: ADMINISTRATIVE | Facility: HOSPITAL | Age: 51
End: 2021-02-25

## 2021-04-23 ENCOUNTER — PATIENT OUTREACH (OUTPATIENT)
Dept: ADMINISTRATIVE | Facility: HOSPITAL | Age: 51
End: 2021-04-23

## 2021-09-13 ENCOUNTER — PATIENT MESSAGE (OUTPATIENT)
Dept: INTERNAL MEDICINE | Facility: CLINIC | Age: 51
End: 2021-09-13

## 2021-09-14 ENCOUNTER — PATIENT MESSAGE (OUTPATIENT)
Dept: INTERNAL MEDICINE | Facility: CLINIC | Age: 51
End: 2021-09-14

## 2021-09-18 ENCOUNTER — LAB VISIT (OUTPATIENT)
Dept: LAB | Facility: HOSPITAL | Age: 51
End: 2021-09-18
Attending: PEDIATRICS
Payer: COMMERCIAL

## 2021-09-18 DIAGNOSIS — Z11.59 ENCOUNTER FOR HEPATITIS C SCREENING TEST FOR LOW RISK PATIENT: ICD-10-CM

## 2021-09-18 DIAGNOSIS — E78.5 DYSLIPIDEMIA: ICD-10-CM

## 2021-09-18 DIAGNOSIS — Z00.00 WELL ADULT EXAM: ICD-10-CM

## 2021-09-18 LAB
ALT SERPL W/O P-5'-P-CCNC: 30 U/L (ref 10–44)
ANION GAP SERPL CALC-SCNC: 11 MMOL/L (ref 8–16)
AST SERPL-CCNC: 23 U/L (ref 10–40)
BUN SERPL-MCNC: 11 MG/DL (ref 6–20)
CALCIUM SERPL-MCNC: 10 MG/DL (ref 8.7–10.5)
CHLORIDE SERPL-SCNC: 103 MMOL/L (ref 95–110)
CHOLEST SERPL-MCNC: 160 MG/DL (ref 120–199)
CHOLEST/HDLC SERPL: 3.4 {RATIO} (ref 2–5)
CO2 SERPL-SCNC: 27 MMOL/L (ref 23–29)
CREAT SERPL-MCNC: 0.8 MG/DL (ref 0.5–1.4)
EST. GFR  (AFRICAN AMERICAN): >60 ML/MIN/1.73 M^2
EST. GFR  (NON AFRICAN AMERICAN): >60 ML/MIN/1.73 M^2
GLUCOSE SERPL-MCNC: 91 MG/DL (ref 70–110)
HDLC SERPL-MCNC: 47 MG/DL (ref 40–75)
HDLC SERPL: 29.4 % (ref 20–50)
LDLC SERPL CALC-MCNC: 98.8 MG/DL (ref 63–159)
NONHDLC SERPL-MCNC: 113 MG/DL
POTASSIUM SERPL-SCNC: 4.8 MMOL/L (ref 3.5–5.1)
SODIUM SERPL-SCNC: 141 MMOL/L (ref 136–145)
TRIGL SERPL-MCNC: 71 MG/DL (ref 30–150)

## 2021-09-18 PROCEDURE — 36415 COLL VENOUS BLD VENIPUNCTURE: CPT | Performed by: PEDIATRICS

## 2021-09-18 PROCEDURE — 80048 BASIC METABOLIC PNL TOTAL CA: CPT | Performed by: PEDIATRICS

## 2021-09-18 PROCEDURE — 84460 ALANINE AMINO (ALT) (SGPT): CPT | Performed by: PEDIATRICS

## 2021-09-18 PROCEDURE — 86803 HEPATITIS C AB TEST: CPT | Performed by: PEDIATRICS

## 2021-09-18 PROCEDURE — 84450 TRANSFERASE (AST) (SGOT): CPT | Performed by: PEDIATRICS

## 2021-09-18 PROCEDURE — 80061 LIPID PANEL: CPT | Performed by: PEDIATRICS

## 2021-09-20 LAB — HCV AB SERPL QL IA: NEGATIVE

## 2021-09-23 ENCOUNTER — OFFICE VISIT (OUTPATIENT)
Dept: INTERNAL MEDICINE | Facility: CLINIC | Age: 51
End: 2021-09-23
Payer: COMMERCIAL

## 2021-09-23 VITALS
RESPIRATION RATE: 16 BRPM | OXYGEN SATURATION: 100 % | TEMPERATURE: 99 F | BODY MASS INDEX: 30.59 KG/M2 | DIASTOLIC BLOOD PRESSURE: 86 MMHG | WEIGHT: 172.63 LBS | SYSTOLIC BLOOD PRESSURE: 132 MMHG | HEIGHT: 63 IN | HEART RATE: 72 BPM

## 2021-09-23 DIAGNOSIS — E78.5 DYSLIPIDEMIA: ICD-10-CM

## 2021-09-23 DIAGNOSIS — Z00.00 WELL ADULT EXAM: Primary | ICD-10-CM

## 2021-09-23 DIAGNOSIS — M17.0 PRIMARY OSTEOARTHRITIS OF BOTH KNEES: ICD-10-CM

## 2021-09-23 PROCEDURE — 3075F PR MOST RECENT SYSTOLIC BLOOD PRESS GE 130-139MM HG: ICD-10-PCS | Mod: CPTII,S$GLB,, | Performed by: PEDIATRICS

## 2021-09-23 PROCEDURE — 3079F PR MOST RECENT DIASTOLIC BLOOD PRESSURE 80-89 MM HG: ICD-10-PCS | Mod: CPTII,S$GLB,, | Performed by: PEDIATRICS

## 2021-09-23 PROCEDURE — 1159F MED LIST DOCD IN RCRD: CPT | Mod: CPTII,S$GLB,, | Performed by: PEDIATRICS

## 2021-09-23 PROCEDURE — 1160F RVW MEDS BY RX/DR IN RCRD: CPT | Mod: CPTII,S$GLB,, | Performed by: PEDIATRICS

## 2021-09-23 PROCEDURE — 3008F BODY MASS INDEX DOCD: CPT | Mod: CPTII,S$GLB,, | Performed by: PEDIATRICS

## 2021-09-23 PROCEDURE — 3075F SYST BP GE 130 - 139MM HG: CPT | Mod: CPTII,S$GLB,, | Performed by: PEDIATRICS

## 2021-09-23 PROCEDURE — 3008F PR BODY MASS INDEX (BMI) DOCUMENTED: ICD-10-PCS | Mod: CPTII,S$GLB,, | Performed by: PEDIATRICS

## 2021-09-23 PROCEDURE — 99396 PR PREVENTIVE VISIT,EST,40-64: ICD-10-PCS | Mod: S$GLB,,, | Performed by: PEDIATRICS

## 2021-09-23 PROCEDURE — 3079F DIAST BP 80-89 MM HG: CPT | Mod: CPTII,S$GLB,, | Performed by: PEDIATRICS

## 2021-09-23 PROCEDURE — 1159F PR MEDICATION LIST DOCUMENTED IN MEDICAL RECORD: ICD-10-PCS | Mod: CPTII,S$GLB,, | Performed by: PEDIATRICS

## 2021-09-23 PROCEDURE — 1160F PR REVIEW ALL MEDS BY PRESCRIBER/CLIN PHARMACIST DOCUMENTED: ICD-10-PCS | Mod: CPTII,S$GLB,, | Performed by: PEDIATRICS

## 2021-09-23 PROCEDURE — 99999 PR PBB SHADOW E&M-EST. PATIENT-LVL IV: ICD-10-PCS | Mod: PBBFAC,,, | Performed by: PEDIATRICS

## 2021-09-23 PROCEDURE — 99999 PR PBB SHADOW E&M-EST. PATIENT-LVL IV: CPT | Mod: PBBFAC,,, | Performed by: PEDIATRICS

## 2021-09-23 PROCEDURE — 99396 PREV VISIT EST AGE 40-64: CPT | Mod: S$GLB,,, | Performed by: PEDIATRICS

## 2021-10-19 ENCOUNTER — PATIENT OUTREACH (OUTPATIENT)
Dept: ADMINISTRATIVE | Facility: HOSPITAL | Age: 51
End: 2021-10-19
Payer: COMMERCIAL

## 2021-11-01 ENCOUNTER — PATIENT OUTREACH (OUTPATIENT)
Dept: ADMINISTRATIVE | Facility: HOSPITAL | Age: 51
End: 2021-11-01
Payer: COMMERCIAL

## 2021-12-01 ENCOUNTER — PATIENT MESSAGE (OUTPATIENT)
Dept: INTERNAL MEDICINE | Facility: CLINIC | Age: 51
End: 2021-12-01
Payer: COMMERCIAL

## 2021-12-01 DIAGNOSIS — R30.0 DYSURIA: Primary | ICD-10-CM

## 2021-12-02 ENCOUNTER — PATIENT MESSAGE (OUTPATIENT)
Dept: INTERNAL MEDICINE | Facility: CLINIC | Age: 51
End: 2021-12-02
Payer: COMMERCIAL

## 2021-12-02 ENCOUNTER — LAB VISIT (OUTPATIENT)
Dept: LAB | Facility: HOSPITAL | Age: 51
End: 2021-12-02
Attending: PEDIATRICS
Payer: COMMERCIAL

## 2021-12-02 DIAGNOSIS — R30.0 DYSURIA: ICD-10-CM

## 2021-12-02 LAB
BACTERIA #/AREA URNS HPF: ABNORMAL /HPF
BILIRUB UR QL STRIP: NEGATIVE
CLARITY UR: CLEAR
COLOR UR: YELLOW
GLUCOSE UR QL STRIP: NEGATIVE
HGB UR QL STRIP: NEGATIVE
KETONES UR QL STRIP: NEGATIVE
LEUKOCYTE ESTERASE UR QL STRIP: ABNORMAL
MICROSCOPIC COMMENT: ABNORMAL
NITRITE UR QL STRIP: NEGATIVE
PH UR STRIP: 6 [PH] (ref 5–8)
PROT UR QL STRIP: NEGATIVE
RBC #/AREA URNS HPF: 2 /HPF (ref 0–4)
SP GR UR STRIP: 1.02 (ref 1–1.03)
SQUAMOUS #/AREA URNS HPF: 4 /HPF
URN SPEC COLLECT METH UR: ABNORMAL
WBC #/AREA URNS HPF: 25 /HPF (ref 0–5)

## 2021-12-02 PROCEDURE — 87186 SC STD MICRODIL/AGAR DIL: CPT | Performed by: PEDIATRICS

## 2021-12-02 PROCEDURE — 87086 URINE CULTURE/COLONY COUNT: CPT | Performed by: PEDIATRICS

## 2021-12-02 PROCEDURE — 87088 URINE BACTERIA CULTURE: CPT | Performed by: PEDIATRICS

## 2021-12-02 PROCEDURE — 81000 URINALYSIS NONAUTO W/SCOPE: CPT | Performed by: PEDIATRICS

## 2021-12-02 PROCEDURE — 87077 CULTURE AEROBIC IDENTIFY: CPT | Performed by: PEDIATRICS

## 2021-12-02 RX ORDER — NITROFURANTOIN (MACROCRYSTALS) 100 MG/1
100 CAPSULE ORAL EVERY 12 HOURS
Qty: 6 CAPSULE | Refills: 0 | Status: SHIPPED | OUTPATIENT
Start: 2021-12-02 | End: 2021-12-05

## 2021-12-03 ENCOUNTER — OFFICE VISIT (OUTPATIENT)
Dept: URGENT CARE | Facility: CLINIC | Age: 51
End: 2021-12-03
Payer: COMMERCIAL

## 2021-12-03 ENCOUNTER — TELEPHONE (OUTPATIENT)
Dept: INTERNAL MEDICINE | Facility: CLINIC | Age: 51
End: 2021-12-03
Payer: COMMERCIAL

## 2021-12-03 VITALS
DIASTOLIC BLOOD PRESSURE: 82 MMHG | BODY MASS INDEX: 29.06 KG/M2 | SYSTOLIC BLOOD PRESSURE: 133 MMHG | RESPIRATION RATE: 18 BRPM | HEIGHT: 63 IN | HEART RATE: 71 BPM | OXYGEN SATURATION: 97 % | TEMPERATURE: 98 F | WEIGHT: 164 LBS

## 2021-12-03 DIAGNOSIS — R09.82 PND (POST-NASAL DRIP): ICD-10-CM

## 2021-12-03 DIAGNOSIS — J01.40 ACUTE NON-RECURRENT PANSINUSITIS: Primary | ICD-10-CM

## 2021-12-03 DIAGNOSIS — J34.89 SINUS PRESSURE: ICD-10-CM

## 2021-12-03 DIAGNOSIS — H65.193 ACUTE EFFUSION OF BOTH MIDDLE EARS: ICD-10-CM

## 2021-12-03 DIAGNOSIS — M54.2 SORE NECK: ICD-10-CM

## 2021-12-03 PROCEDURE — 99214 OFFICE O/P EST MOD 30 MIN: CPT | Mod: 25,S$GLB,, | Performed by: PHYSICIAN ASSISTANT

## 2021-12-03 PROCEDURE — 99214 PR OFFICE/OUTPT VISIT, EST, LEVL IV, 30-39 MIN: ICD-10-PCS | Mod: 25,S$GLB,, | Performed by: PHYSICIAN ASSISTANT

## 2021-12-03 PROCEDURE — 96372 PR INJECTION,THERAP/PROPH/DIAG2ST, IM OR SUBCUT: ICD-10-PCS | Mod: S$GLB,,, | Performed by: PHYSICIAN ASSISTANT

## 2021-12-03 PROCEDURE — 96372 THER/PROPH/DIAG INJ SC/IM: CPT | Mod: S$GLB,,, | Performed by: PHYSICIAN ASSISTANT

## 2021-12-03 RX ORDER — KETOROLAC TROMETHAMINE 30 MG/ML
30 INJECTION, SOLUTION INTRAMUSCULAR; INTRAVENOUS
Status: COMPLETED | OUTPATIENT
Start: 2021-12-03 | End: 2021-12-03

## 2021-12-03 RX ORDER — FLUTICASONE PROPIONATE 50 MCG
1 SPRAY, SUSPENSION (ML) NASAL DAILY
Qty: 9.9 ML | Refills: 0 | Status: SHIPPED | OUTPATIENT
Start: 2021-12-03 | End: 2022-02-10

## 2021-12-03 RX ORDER — MELOXICAM 7.5 MG/1
TABLET ORAL
COMMUNITY
Start: 2021-10-29 | End: 2022-02-10

## 2021-12-03 RX ORDER — MINERAL OIL
180 ENEMA (ML) RECTAL DAILY
Qty: 30 TABLET | Refills: 0 | Status: SHIPPED | OUTPATIENT
Start: 2021-12-03 | End: 2022-02-10

## 2021-12-03 RX ADMIN — KETOROLAC TROMETHAMINE 30 MG: 30 INJECTION, SOLUTION INTRAMUSCULAR; INTRAVENOUS at 08:12

## 2021-12-04 LAB — BACTERIA UR CULT: ABNORMAL

## 2021-12-06 ENCOUNTER — PATIENT MESSAGE (OUTPATIENT)
Dept: INTERNAL MEDICINE | Facility: CLINIC | Age: 51
End: 2021-12-06
Payer: COMMERCIAL

## 2021-12-06 ENCOUNTER — TELEPHONE (OUTPATIENT)
Dept: URGENT CARE | Facility: CLINIC | Age: 51
End: 2021-12-06
Payer: COMMERCIAL

## 2021-12-06 DIAGNOSIS — R30.0 DYSURIA: Primary | ICD-10-CM

## 2021-12-07 ENCOUNTER — LAB VISIT (OUTPATIENT)
Dept: LAB | Facility: HOSPITAL | Age: 51
End: 2021-12-07
Attending: PEDIATRICS
Payer: COMMERCIAL

## 2021-12-07 ENCOUNTER — TELEPHONE (OUTPATIENT)
Dept: INTERNAL MEDICINE | Facility: CLINIC | Age: 51
End: 2021-12-07
Payer: COMMERCIAL

## 2021-12-07 ENCOUNTER — PATIENT MESSAGE (OUTPATIENT)
Dept: INTERNAL MEDICINE | Facility: CLINIC | Age: 51
End: 2021-12-07
Payer: COMMERCIAL

## 2021-12-07 DIAGNOSIS — R30.0 DYSURIA: ICD-10-CM

## 2021-12-07 LAB
BACTERIA #/AREA URNS HPF: NORMAL /HPF
BILIRUB UR QL STRIP: ABNORMAL
CLARITY UR: CLEAR
COLOR UR: ABNORMAL
GLUCOSE UR QL STRIP: ABNORMAL
HGB UR QL STRIP: ABNORMAL
KETONES UR QL STRIP: ABNORMAL
LEUKOCYTE ESTERASE UR QL STRIP: ABNORMAL
MICROSCOPIC COMMENT: NORMAL
NITRITE UR QL STRIP: ABNORMAL
PH UR STRIP: ABNORMAL [PH] (ref 5–8)
PROT UR QL STRIP: ABNORMAL
SP GR UR STRIP: ABNORMAL (ref 1–1.03)
SQUAMOUS #/AREA URNS HPF: 5 /HPF
URN SPEC COLLECT METH UR: ABNORMAL

## 2021-12-07 PROCEDURE — 81000 URINALYSIS NONAUTO W/SCOPE: CPT | Performed by: PEDIATRICS

## 2021-12-07 PROCEDURE — 87086 URINE CULTURE/COLONY COUNT: CPT | Performed by: PEDIATRICS

## 2021-12-09 ENCOUNTER — OFFICE VISIT (OUTPATIENT)
Dept: INTERNAL MEDICINE | Facility: CLINIC | Age: 51
End: 2021-12-09
Payer: COMMERCIAL

## 2021-12-09 VITALS
RESPIRATION RATE: 16 BRPM | DIASTOLIC BLOOD PRESSURE: 88 MMHG | HEART RATE: 80 BPM | SYSTOLIC BLOOD PRESSURE: 134 MMHG | WEIGHT: 170.19 LBS | BODY MASS INDEX: 30.15 KG/M2

## 2021-12-09 DIAGNOSIS — J01.10 ACUTE FRONTAL SINUSITIS, RECURRENCE NOT SPECIFIED: Primary | ICD-10-CM

## 2021-12-09 PROBLEM — Z12.11 COLON CANCER SCREENING: Status: RESOLVED | Noted: 2018-08-03 | Resolved: 2021-12-09

## 2021-12-09 LAB
BACTERIA UR CULT: NO GROWTH
CTP QC/QA: YES
SARS-COV-2 RDRP RESP QL NAA+PROBE: NEGATIVE

## 2021-12-09 PROCEDURE — U0002: ICD-10-PCS | Mod: QW,S$GLB,, | Performed by: NURSE PRACTITIONER

## 2021-12-09 PROCEDURE — 99213 OFFICE O/P EST LOW 20 MIN: CPT | Mod: 25,S$GLB,, | Performed by: NURSE PRACTITIONER

## 2021-12-09 PROCEDURE — 99213 PR OFFICE/OUTPT VISIT, EST, LEVL III, 20-29 MIN: ICD-10-PCS | Mod: 25,S$GLB,, | Performed by: NURSE PRACTITIONER

## 2021-12-09 PROCEDURE — 96372 PR INJECTION,THERAP/PROPH/DIAG2ST, IM OR SUBCUT: ICD-10-PCS | Mod: S$GLB,,, | Performed by: NURSE PRACTITIONER

## 2021-12-09 PROCEDURE — 99999 PR PBB SHADOW E&M-EST. PATIENT-LVL III: ICD-10-PCS | Mod: PBBFAC,,, | Performed by: NURSE PRACTITIONER

## 2021-12-09 PROCEDURE — U0002 COVID-19 LAB TEST NON-CDC: HCPCS | Mod: QW,S$GLB,, | Performed by: NURSE PRACTITIONER

## 2021-12-09 PROCEDURE — 96372 THER/PROPH/DIAG INJ SC/IM: CPT | Mod: S$GLB,,, | Performed by: NURSE PRACTITIONER

## 2021-12-09 PROCEDURE — 99999 PR PBB SHADOW E&M-EST. PATIENT-LVL III: CPT | Mod: PBBFAC,,, | Performed by: NURSE PRACTITIONER

## 2021-12-09 RX ORDER — TRIAMCINOLONE ACETONIDE 40 MG/ML
60 INJECTION, SUSPENSION INTRA-ARTICULAR; INTRAMUSCULAR
Status: COMPLETED | OUTPATIENT
Start: 2021-12-09 | End: 2021-12-09

## 2021-12-09 RX ADMIN — TRIAMCINOLONE ACETONIDE 60 MG: 40 INJECTION, SUSPENSION INTRA-ARTICULAR; INTRAMUSCULAR at 03:12

## 2021-12-10 ENCOUNTER — TELEPHONE (OUTPATIENT)
Dept: INTERNAL MEDICINE | Facility: CLINIC | Age: 51
End: 2021-12-10
Payer: COMMERCIAL

## 2022-02-17 ENCOUNTER — OFFICE VISIT (OUTPATIENT)
Dept: URGENT CARE | Facility: CLINIC | Age: 52
End: 2022-02-17
Payer: COMMERCIAL

## 2022-02-17 VITALS
SYSTOLIC BLOOD PRESSURE: 150 MMHG | HEART RATE: 85 BPM | BODY MASS INDEX: 28.7 KG/M2 | HEIGHT: 63 IN | TEMPERATURE: 101 F | WEIGHT: 162 LBS | DIASTOLIC BLOOD PRESSURE: 87 MMHG | RESPIRATION RATE: 20 BRPM | OXYGEN SATURATION: 97 %

## 2022-02-17 DIAGNOSIS — R03.0 ELEVATED BLOOD PRESSURE READING IN OFFICE WITH WHITE COAT SYNDROME, WITHOUT DIAGNOSIS OF HYPERTENSION: ICD-10-CM

## 2022-02-17 DIAGNOSIS — J01.40 SUBACUTE PANSINUSITIS: Primary | ICD-10-CM

## 2022-02-17 DIAGNOSIS — Z20.822 LAB TEST NEGATIVE FOR COVID-19 VIRUS: ICD-10-CM

## 2022-02-17 DIAGNOSIS — R51.9 SINUS HEADACHE: ICD-10-CM

## 2022-02-17 DIAGNOSIS — Z11.52 ENCOUNTER FOR SCREENING LABORATORY TESTING FOR COVID-19 VIRUS: ICD-10-CM

## 2022-02-17 DIAGNOSIS — R50.81 FEVER IN OTHER DISEASES: ICD-10-CM

## 2022-02-17 LAB
CTP QC/QA: YES
SARS-COV-2 RDRP RESP QL NAA+PROBE: NEGATIVE

## 2022-02-17 PROCEDURE — 1159F MED LIST DOCD IN RCRD: CPT | Mod: CPTII,S$GLB,, | Performed by: PHYSICIAN ASSISTANT

## 2022-02-17 PROCEDURE — 1160F RVW MEDS BY RX/DR IN RCRD: CPT | Mod: CPTII,S$GLB,, | Performed by: PHYSICIAN ASSISTANT

## 2022-02-17 PROCEDURE — 3079F PR MOST RECENT DIASTOLIC BLOOD PRESSURE 80-89 MM HG: ICD-10-PCS | Mod: CPTII,S$GLB,, | Performed by: PHYSICIAN ASSISTANT

## 2022-02-17 PROCEDURE — 99214 PR OFFICE/OUTPT VISIT, EST, LEVL IV, 30-39 MIN: ICD-10-PCS | Mod: S$GLB,,, | Performed by: PHYSICIAN ASSISTANT

## 2022-02-17 PROCEDURE — 3008F PR BODY MASS INDEX (BMI) DOCUMENTED: ICD-10-PCS | Mod: CPTII,S$GLB,, | Performed by: PHYSICIAN ASSISTANT

## 2022-02-17 PROCEDURE — U0002: ICD-10-PCS | Mod: QW,S$GLB,, | Performed by: PHYSICIAN ASSISTANT

## 2022-02-17 PROCEDURE — 3079F DIAST BP 80-89 MM HG: CPT | Mod: CPTII,S$GLB,, | Performed by: PHYSICIAN ASSISTANT

## 2022-02-17 PROCEDURE — 3077F PR MOST RECENT SYSTOLIC BLOOD PRESSURE >= 140 MM HG: ICD-10-PCS | Mod: CPTII,S$GLB,, | Performed by: PHYSICIAN ASSISTANT

## 2022-02-17 PROCEDURE — 1159F PR MEDICATION LIST DOCUMENTED IN MEDICAL RECORD: ICD-10-PCS | Mod: CPTII,S$GLB,, | Performed by: PHYSICIAN ASSISTANT

## 2022-02-17 PROCEDURE — 3008F BODY MASS INDEX DOCD: CPT | Mod: CPTII,S$GLB,, | Performed by: PHYSICIAN ASSISTANT

## 2022-02-17 PROCEDURE — 99214 OFFICE O/P EST MOD 30 MIN: CPT | Mod: S$GLB,,, | Performed by: PHYSICIAN ASSISTANT

## 2022-02-17 PROCEDURE — 3077F SYST BP >= 140 MM HG: CPT | Mod: CPTII,S$GLB,, | Performed by: PHYSICIAN ASSISTANT

## 2022-02-17 PROCEDURE — 1160F PR REVIEW ALL MEDS BY PRESCRIBER/CLIN PHARMACIST DOCUMENTED: ICD-10-PCS | Mod: CPTII,S$GLB,, | Performed by: PHYSICIAN ASSISTANT

## 2022-02-17 PROCEDURE — U0002 COVID-19 LAB TEST NON-CDC: HCPCS | Mod: QW,S$GLB,, | Performed by: PHYSICIAN ASSISTANT

## 2022-02-17 RX ORDER — ACETAMINOPHEN 500 MG
1000 TABLET ORAL
Status: COMPLETED | OUTPATIENT
Start: 2022-02-17 | End: 2022-02-17

## 2022-02-17 RX ORDER — MONTELUKAST SODIUM 10 MG/1
10 TABLET ORAL DAILY
Qty: 7 TABLET | Refills: 0 | Status: SHIPPED | OUTPATIENT
Start: 2022-02-17 | End: 2022-02-24

## 2022-02-17 RX ORDER — FLUTICASONE PROPIONATE 50 MCG
1 SPRAY, SUSPENSION (ML) NASAL DAILY
Qty: 9.9 ML | Refills: 0 | Status: SHIPPED | OUTPATIENT
Start: 2022-02-17 | End: 2023-02-14

## 2022-02-17 RX ADMIN — Medication 1000 MG: at 11:02

## 2022-02-17 NOTE — LETTER
[address]  [facility] [address]  Phone: [phone]          Return to Work/School    Patient: Olga Alva  YOB: 1970   Date: 02/17/2022     To Whom It May Concern:     Olga Alva was in contact with/seen in my office on 02/17/2022. COVID-19 is present in our communities across the [facility]. There is limited testing for COVID at this time, so not all patients can be tested. In this situation, your employee meets the following criteria:     Olga Alva has met the criteria for COVID-19 testing and has a NEGATIVE result. The employee can return to work once they are asymptomatic for 24 hours without the use of fever reducing medications (Tylenol, Motrin, etc).     If you have any questions or concerns, or if I can be of further assistance, please do not hesitate to contact me.     Sincerely,    Ann Ellis PA-C

## 2022-02-17 NOTE — PATIENT INSTRUCTIONS
Elevated blood pressure reading     Your blood pressure elevated in clinic today.-- please keep an eye on blood pressures.  Record blood pressures and follow up with primary care doctor if blood pressures continue to be elevated  Recommend lifestyle modifications--DASH diet, exercise, weight loss, reducing stress    Singulair as directed once a day for the next 7 days, then you may switch to 1 of the over-the-counter antihistamines listed below.      Use Flonase 1 sprays/nostril per day. It is a local acting steroid nasal spray, if you develop a bloody nose, stop using the medication immediately.    How do you use a Nasal Corticosteroid Spray?    Make sure you understand your dosing instructions. Spray only the number of prescribed sprays in each nostril. Read the package instructions before using your spray the first time.    Most corticosteroid sprays suggest the following steps:    Wash your hands well.    Gently blow your nose to clear the passageway.    Shake the container several times.    Tilt your head slightly downward.  Use the opposite hand from the nostril you will be spraying to hold the spray bottle.    Block one nostril with your finger.  Insert the nasal applicator into the other nostril.    Aim the spray toward the outer wall of the nostril.  Inhale slowly through the nose and press the .    Breathe out and repeat to apply the prescribed number of sprays.  Repeat these steps for the other nostril.     Avoid sneezing or blowing your nose right after spraying.         OVER THE COUNTER RECOMMENDATIONS/SUGGESTIONS.--if needed  Remember to switch antihistamines every 3 months, if taken daily.     Make sure to stay well hydrated.    Use Nasal Saline to mechanically move any post nasal drip from your eustachian tube or from the back of your throat.    Use warm salt water gargles to ease your throat pain. Warm salt water gargles as needed for sore throat-  1/2 tsp salt to 1 cup warm water,  gargle as desired.    Use an antihistamine such as Claritin, Zyrtec or Allegra to dry you out (NONDROWSY) or Benadryl (DROWSY).    Use pseudoephedrine (behind the counter) to decongest. Pseudoephedrine  30 mg up to 240 mg /day. *BE AWARE- It can raise your blood pressure and give you palpitations.    Use mucinex (guaifenisin) to break up mucous up to 2400mg/day to loosen any mucous.   The mucinex DM pill has a cough suppressant that can be sedating. It can be used at night to stop the tickle at the back of your throat.  You can use Mucinex D (it has guaifenesin and a high dose of pseudoephedrine) in the mornings to help decongest.      Sometimes Nyquil at night is beneficial to help you get some rest, however it is sedating and it does have an antihistamine, and tylenol.    Honey is a natural cough suppressant that can be used.    Tylenol up to 4,000 mg a day is safe for short periods and can be used for headache, body aches, pain, and fever. However in high doses and prolonged use it can cause liver irritation.    Ibuprofen is a non-steroidal anti-inflammatory that can be used for headache, body aches, pain, and fever.However it can also cause stomach irritation if over used.

## 2022-02-17 NOTE — PROGRESS NOTES
"Subjective:       Patient ID: Olga Alva is a 51 y.o. female.    Vitals:  height is 5' 3" (1.6 m) and weight is 73.5 kg (162 lb). Her temperature is 100.5 °F (38.1 °C) (abnormal). Her blood pressure is 150/87 (abnormal) and her pulse is 85. Her respiration is 20 and oxygen saturation is 97%.     Chief Complaint: COVID-19 Concerns (test for covid - Entered by patient) and Sinus Problem    Patients presents to clinic with complaint of sinus congestion and sneezing since Monday evening.  There is associated runny nose and  headache.  She is requesting a COVID test but vaccinated.  Patient reports the mucus is clear.    Sinus Problem  This is a new problem. The current episode started in the past 7 days. The problem is unchanged. There has been no fever. Her pain is at a severity of 0/10. She is experiencing no pain. Associated symptoms include congestion, coughing, headaches, sinus pressure and sneezing. Pertinent negatives include no chills, diaphoresis, ear pain, hoarse voice, neck pain, shortness of breath, sore throat or swollen glands. Treatments tried: Mucinex DM  Zyrtec advil. The treatment provided mild relief.   URI   Associated symptoms include congestion, coughing, headaches and sneezing. Pertinent negatives include no abdominal pain, chest pain, diarrhea, dysuria, ear pain, nausea, neck pain, rash, sore throat, swollen glands, vomiting or wheezing.       Constitution: Negative for chills, sweating, fatigue, generalized weakness and international travel in last 60 days.   HENT: Positive for congestion and sinus pressure. Negative for ear pain, tinnitus, drooling, tongue pain, facial swelling, sore throat, trouble swallowing and voice change.    Neck: Negative for neck pain, neck stiffness and neck swelling.   Cardiovascular: Negative for chest pain and palpitations.   Eyes: Negative for eye pain, photophobia and vision loss.   Respiratory: Positive for cough. Negative for chest tightness, sputum " "production, shortness of breath and wheezing.    Gastrointestinal: Negative for abdominal pain, nausea, vomiting, constipation and diarrhea.   Genitourinary: Negative for dysuria and hematuria.   Musculoskeletal: Negative for pain and back pain.   Skin: Negative for rash, erythema and bruising.   Allergic/Immunologic: Positive for sneezing.   Neurological: Positive for headaches. Negative for dizziness, light-headedness, speech difficulty, altered mental status, loss of consciousness, numbness and tingling.   Psychiatric/Behavioral: Negative for altered mental status.       Objective:       Vitals:    02/17/22 1136   BP: (!) 150/87   Pulse: 85   Resp: 20   Temp: (!) 100.5 °F (38.1 °C)   SpO2: 97%   Weight: 73.5 kg (162 lb)   Height: 5' 3" (1.6 m)       Physical Exam   Constitutional: She is oriented to person, place, and time. She appears well-developed. She is cooperative.  Non-toxic appearance. She does not appear ill. No distress. awake  HENT:   Head: Normocephalic and atraumatic.   Ears:   Right Ear: Hearing, external ear and ear canal normal. No drainage, swelling or tenderness. Tympanic membrane is not erythematous and not bulging. A middle ear effusion is present. No decreased hearing is noted. impacted cerumen  Left Ear: Hearing, external ear and ear canal normal. No drainage, swelling or tenderness. Tympanic membrane is not erythematous and not bulging. A middle ear effusion is present. No decreased hearing is noted. impacted cerumen  Nose: Congestion present. No mucosal edema, rhinorrhea, purulent discharge or nasal deformity. No epistaxis. Right sinus exhibits no maxillary sinus tenderness and no frontal sinus tenderness. Left sinus exhibits no maxillary sinus tenderness and no frontal sinus tenderness.   Mouth/Throat: Uvula is midline and mucous membranes are normal. Mucous membranes are moist. No oral lesions. No trismus in the jaw. Normal dentition. No uvula swelling. No oropharyngeal exudate, " posterior oropharyngeal edema, posterior oropharyngeal erythema, tonsillar abscesses or cobblestoning. Tonsils are 0 on the right. Tonsils are 0 on the left. No tonsillar exudate. Oropharynx is clear.   Eyes: Conjunctivae and lids are normal. Pupils are equal, round, and reactive to light. No visual field deficit is present. Right eye exhibits no discharge. Left eye exhibits no discharge. No scleral icterus. Right eye exhibits no nystagmus. Left eye exhibits no nystagmus.      extraocular movement intact vision grossly intact gaze aligned appropriately periorbital hyperpigmentation   Neck: Trachea normal and phonation normal. Neck supple. Carotid bruit is not present. No Brudzinski's sign and no Kernig's sign noted. No neck rigidity present.   Cardiovascular: Normal rate, regular rhythm, S1 normal, S2 normal, normal heart sounds and normal pulses. Exam reveals no decreased pulses.   Pulmonary/Chest: Effort normal and breath sounds normal. No accessory muscle usage. No tachypnea. No respiratory distress. She has no decreased breath sounds. She has no wheezes. She has no rhonchi. She exhibits no tenderness, no crepitus and no swelling.   Abdominal: Normal appearance and bowel sounds are normal. She exhibits no distension, no pulsatile midline mass and no mass. Soft. There is no abdominal tenderness. There is no rebound, no guarding, no left CVA tenderness and no right CVA tenderness.   Musculoskeletal: Normal range of motion.         General: No swelling, tenderness, deformity or signs of injury. Normal range of motion.      Cervical back: She exhibits no tenderness.      Right lower leg: No edema.      Left lower leg: No edema.   Lymphadenopathy:     She has no cervical adenopathy.   Neurological: no focal deficit. She is alert, oriented to person, place, and time and at baseline. She has normal motor skills, normal sensation and intact cranial nerves. She displays no weakness and no dysarthria. No cranial nerve  deficit. She exhibits normal muscle tone. Gait normal. Coordination and gait normal. GCS eye subscore is 4. GCS verbal subscore is 5. GCS motor subscore is 6.   Skin: Skin is warm, dry, intact, not diaphoretic, not pale and no rash. Capillary refill takes less than 2 seconds. No erythema and No lesion   Psychiatric: Her speech is normal and behavior is normal. Judgment and thought content normal.   Nursing note and vitals reviewed.        Assessment:       1. Subacute pansinusitis    2. Encounter for screening laboratory testing for COVID-19 virus    3. Fever in other diseases    4. Elevated blood pressure reading in office with white coat syndrome, without diagnosis of hypertension    5. Lab test negative for COVID-19 virus    6. Sinus headache        Results for orders placed or performed in visit on 02/17/22   POCT COVID-19 Rapid Screening   Result Value Ref Range    POC Rapid COVID Negative Negative     Acceptable Yes        Plan:         Subacute pansinusitis  -     montelukast (SINGULAIR) 10 mg tablet; Take 1 tablet (10 mg total) by mouth once daily. for 7 days  Dispense: 7 tablet; Refill: 0  -     fluticasone propionate (FLONASE) 50 mcg/actuation nasal spray; 1 spray (50 mcg total) by Each Nostril route once daily.  Dispense: 9.9 mL; Refill: 0    Encounter for screening laboratory testing for COVID-19 virus  -     POCT COVID-19 Rapid Screening    Fever in other diseases  -     acetaminophen tablet 1,000 mg    Elevated blood pressure reading in office with white coat syndrome, without diagnosis of hypertension    Lab test negative for COVID-19 virus    Sinus headache                  Patient Instructions   Elevated blood pressure reading     Your blood pressure elevated in clinic today.-- please keep an eye on blood pressures.  Record blood pressures and follow up with primary care doctor if blood pressures continue to be elevated  Recommend lifestyle modifications--DASH diet, exercise, weight  loss, reducing stress    Singulair as directed once a day for the next 7 days, then you may switch to 1 of the over-the-counter antihistamines listed below.      Use Flonase 1 sprays/nostril per day. It is a local acting steroid nasal spray, if you develop a bloody nose, stop using the medication immediately.    How do you use a Nasal Corticosteroid Spray?    Make sure you understand your dosing instructions. Spray only the number of prescribed sprays in each nostril. Read the package instructions before using your spray the first time.    Most corticosteroid sprays suggest the following steps:    Wash your hands well.    Gently blow your nose to clear the passageway.    Shake the container several times.    Tilt your head slightly downward.  Use the opposite hand from the nostril you will be spraying to hold the spray bottle.    Block one nostril with your finger.  Insert the nasal applicator into the other nostril.    Aim the spray toward the outer wall of the nostril.  Inhale slowly through the nose and press the .    Breathe out and repeat to apply the prescribed number of sprays.  Repeat these steps for the other nostril.     Avoid sneezing or blowing your nose right after spraying.         OVER THE COUNTER RECOMMENDATIONS/SUGGESTIONS.--if needed  Remember to switch antihistamines every 3 months, if taken daily.     Make sure to stay well hydrated.    Use Nasal Saline to mechanically move any post nasal drip from your eustachian tube or from the back of your throat.    Use warm salt water gargles to ease your throat pain. Warm salt water gargles as needed for sore throat-  1/2 tsp salt to 1 cup warm water, gargle as desired.    Use an antihistamine such as Claritin, Zyrtec or Allegra to dry you out (NONDROWSY) or Benadryl (DROWSY).    Use pseudoephedrine (behind the counter) to decongest. Pseudoephedrine  30 mg up to 240 mg /day. *BE AWARE- It can raise your blood pressure and give you  palpitations.    Use mucinex (guaifenisin) to break up mucous up to 2400mg/day to loosen any mucous.   The mucinex DM pill has a cough suppressant that can be sedating. It can be used at night to stop the tickle at the back of your throat.  You can use Mucinex D (it has guaifenesin and a high dose of pseudoephedrine) in the mornings to help decongest.      Sometimes Nyquil at night is beneficial to help you get some rest, however it is sedating and it does have an antihistamine, and tylenol.    Honey is a natural cough suppressant that can be used.    Tylenol up to 4,000 mg a day is safe for short periods and can be used for headache, body aches, pain, and fever. However in high doses and prolonged use it can cause liver irritation.    Ibuprofen is a non-steroidal anti-inflammatory that can be used for headache, body aches, pain, and fever.However it can also cause stomach irritation if over used.

## 2022-02-20 ENCOUNTER — TELEPHONE (OUTPATIENT)
Dept: URGENT CARE | Facility: CLINIC | Age: 52
End: 2022-02-20
Payer: COMMERCIAL

## 2022-02-20 NOTE — TELEPHONE ENCOUNTER
Staff attempted to contact patient to follow up after UC visit on 02/17/22, no answer. Voicemail left with callback number.

## 2022-06-07 ENCOUNTER — PATIENT MESSAGE (OUTPATIENT)
Dept: INTERNAL MEDICINE | Facility: CLINIC | Age: 52
End: 2022-06-07
Payer: COMMERCIAL

## 2022-08-04 DIAGNOSIS — E78.5 DYSLIPIDEMIA: ICD-10-CM

## 2022-08-04 RX ORDER — ATORVASTATIN CALCIUM 10 MG/1
TABLET, FILM COATED ORAL
Qty: 90 TABLET | Refills: 0 | Status: SHIPPED | OUTPATIENT
Start: 2022-08-04 | End: 2022-10-21 | Stop reason: SDUPTHER

## 2022-08-04 NOTE — TELEPHONE ENCOUNTER
Refill Decision Note   Olga Alva  is requesting a refill authorization.  Brief Assessment and Rationale for Refill:  Approve    -Medication-Related Problems Identified: Requires labs  Medication Therapy Plan:  needs o/v, CMP, and lipid panel    Medication Reconciliation Completed: No   Comments:     Provider Staff:     Action is required for this patient.   Please see care gap opportunities below in Care Due Message.     Thanks!  [facility]     Appointments      Date Provider   Last Visit   9/23/2021 RADHA Olivera Jr., MD   Next Visit   Visit date not found RADHA Olivera Jr., MD     Note composed:1:07 PM 08/04/2022           Note composed:1:07 PM 08/04/2022

## 2022-10-17 ENCOUNTER — PATIENT MESSAGE (OUTPATIENT)
Dept: INTERNAL MEDICINE | Facility: CLINIC | Age: 52
End: 2022-10-17
Payer: COMMERCIAL

## 2022-10-17 DIAGNOSIS — E78.5 DYSLIPIDEMIA: Primary | ICD-10-CM

## 2022-10-18 ENCOUNTER — LAB VISIT (OUTPATIENT)
Dept: LAB | Facility: HOSPITAL | Age: 52
End: 2022-10-18
Attending: PEDIATRICS
Payer: COMMERCIAL

## 2022-10-18 DIAGNOSIS — E78.5 DYSLIPIDEMIA: ICD-10-CM

## 2022-10-18 LAB
ALBUMIN SERPL BCP-MCNC: 4 G/DL (ref 3.5–5.2)
ALP SERPL-CCNC: 66 U/L (ref 55–135)
ALT SERPL W/O P-5'-P-CCNC: 18 U/L (ref 10–44)
ANION GAP SERPL CALC-SCNC: 13 MMOL/L (ref 8–16)
AST SERPL-CCNC: 22 U/L (ref 10–40)
BILIRUB SERPL-MCNC: 0.6 MG/DL (ref 0.1–1)
BUN SERPL-MCNC: 14 MG/DL (ref 6–20)
CALCIUM SERPL-MCNC: 9.8 MG/DL (ref 8.7–10.5)
CHLORIDE SERPL-SCNC: 105 MMOL/L (ref 95–110)
CHOLEST SERPL-MCNC: 171 MG/DL (ref 120–199)
CHOLEST/HDLC SERPL: 3.6 {RATIO} (ref 2–5)
CO2 SERPL-SCNC: 23 MMOL/L (ref 23–29)
CREAT SERPL-MCNC: 0.8 MG/DL (ref 0.5–1.4)
EST. GFR  (NO RACE VARIABLE): >60 ML/MIN/1.73 M^2
GLUCOSE SERPL-MCNC: 92 MG/DL (ref 70–110)
HDLC SERPL-MCNC: 47 MG/DL (ref 40–75)
HDLC SERPL: 27.5 % (ref 20–50)
LDLC SERPL CALC-MCNC: 109.4 MG/DL (ref 63–159)
NONHDLC SERPL-MCNC: 124 MG/DL
POTASSIUM SERPL-SCNC: 4.4 MMOL/L (ref 3.5–5.1)
PROT SERPL-MCNC: 7.5 G/DL (ref 6–8.4)
SODIUM SERPL-SCNC: 141 MMOL/L (ref 136–145)
TRIGL SERPL-MCNC: 73 MG/DL (ref 30–150)

## 2022-10-18 PROCEDURE — 80053 COMPREHEN METABOLIC PANEL: CPT | Performed by: PEDIATRICS

## 2022-10-18 PROCEDURE — 80061 LIPID PANEL: CPT | Performed by: PEDIATRICS

## 2022-10-18 PROCEDURE — 36415 COLL VENOUS BLD VENIPUNCTURE: CPT | Performed by: PEDIATRICS

## 2022-10-21 ENCOUNTER — OFFICE VISIT (OUTPATIENT)
Dept: INTERNAL MEDICINE | Facility: CLINIC | Age: 52
End: 2022-10-21
Payer: COMMERCIAL

## 2022-10-21 VITALS
SYSTOLIC BLOOD PRESSURE: 132 MMHG | DIASTOLIC BLOOD PRESSURE: 74 MMHG | BODY MASS INDEX: 29.69 KG/M2 | WEIGHT: 167.56 LBS | HEART RATE: 76 BPM | OXYGEN SATURATION: 97 % | HEIGHT: 63 IN

## 2022-10-21 DIAGNOSIS — E78.5 DYSLIPIDEMIA: ICD-10-CM

## 2022-10-21 DIAGNOSIS — Z00.00 WELL ADULT EXAM: Primary | ICD-10-CM

## 2022-10-21 DIAGNOSIS — M17.0 PRIMARY OSTEOARTHRITIS OF BOTH KNEES: ICD-10-CM

## 2022-10-21 PROCEDURE — 99396 PREV VISIT EST AGE 40-64: CPT | Mod: S$GLB,,, | Performed by: PEDIATRICS

## 2022-10-21 PROCEDURE — 1160F PR REVIEW ALL MEDS BY PRESCRIBER/CLIN PHARMACIST DOCUMENTED: ICD-10-PCS | Mod: CPTII,S$GLB,, | Performed by: PEDIATRICS

## 2022-10-21 PROCEDURE — 3075F PR MOST RECENT SYSTOLIC BLOOD PRESS GE 130-139MM HG: ICD-10-PCS | Mod: CPTII,S$GLB,, | Performed by: PEDIATRICS

## 2022-10-21 PROCEDURE — 99396 PR PREVENTIVE VISIT,EST,40-64: ICD-10-PCS | Mod: S$GLB,,, | Performed by: PEDIATRICS

## 2022-10-21 PROCEDURE — 1159F MED LIST DOCD IN RCRD: CPT | Mod: CPTII,S$GLB,, | Performed by: PEDIATRICS

## 2022-10-21 PROCEDURE — 1160F RVW MEDS BY RX/DR IN RCRD: CPT | Mod: CPTII,S$GLB,, | Performed by: PEDIATRICS

## 2022-10-21 PROCEDURE — 3078F DIAST BP <80 MM HG: CPT | Mod: CPTII,S$GLB,, | Performed by: PEDIATRICS

## 2022-10-21 PROCEDURE — 3075F SYST BP GE 130 - 139MM HG: CPT | Mod: CPTII,S$GLB,, | Performed by: PEDIATRICS

## 2022-10-21 PROCEDURE — 99999 PR PBB SHADOW E&M-EST. PATIENT-LVL IV: CPT | Mod: PBBFAC,,, | Performed by: PEDIATRICS

## 2022-10-21 PROCEDURE — 1159F PR MEDICATION LIST DOCUMENTED IN MEDICAL RECORD: ICD-10-PCS | Mod: CPTII,S$GLB,, | Performed by: PEDIATRICS

## 2022-10-21 PROCEDURE — 3078F PR MOST RECENT DIASTOLIC BLOOD PRESSURE < 80 MM HG: ICD-10-PCS | Mod: CPTII,S$GLB,, | Performed by: PEDIATRICS

## 2022-10-21 PROCEDURE — 99999 PR PBB SHADOW E&M-EST. PATIENT-LVL IV: ICD-10-PCS | Mod: PBBFAC,,, | Performed by: PEDIATRICS

## 2022-10-21 RX ORDER — ATORVASTATIN CALCIUM 10 MG/1
10 TABLET, FILM COATED ORAL DAILY
Qty: 90 TABLET | Refills: 3 | Status: SHIPPED | OUTPATIENT
Start: 2022-10-21 | End: 2023-10-17

## 2022-10-21 NOTE — PROGRESS NOTES
Subjective:       Patient ID: Olga Alva is a 52 y.o. female.    Chief Complaint: Annual Exam    Olga Alva is a 52 y.o. female who presents to clinic for her annual    1) Lipids:Tolerating lipitor, is successful working at [employer], weight is down  2) DJD: knees are stable, currently not causing trouble.    LABS REVIEWED AND DISCUSSED WITH PATIENT: CMP and lipids    PMHx, PSHx, SocHx, and FHx reviewed and discussed with patient.     Review of Systems   Constitutional:  Negative for activity change, appetite change, chills, diaphoresis, fatigue, fever and unexpected weight change.   HENT:  Negative for nasal congestion, ear pain, hearing loss, mouth sores, nosebleeds, postnasal drip, rhinorrhea, sneezing, sore throat and trouble swallowing.    Eyes:  Negative for photophobia, pain, discharge, redness and visual disturbance.   Respiratory:  Negative for cough, chest tightness, shortness of breath, wheezing and stridor.    Cardiovascular:  Negative for chest pain, palpitations and leg swelling.   Gastrointestinal:  Negative for blood in stool, constipation, diarrhea, nausea and vomiting.   Endocrine: Negative for polydipsia and polyuria.   Genitourinary:  Negative for decreased urine volume, difficulty urinating, dysuria, flank pain, frequency, hematuria, menstrual problem and urgency.   Musculoskeletal:  Negative for arthralgias, back pain, joint swelling, neck pain and neck stiffness.   Integumentary:  Negative for color change and rash.   Neurological:  Negative for dizziness, syncope, speech difficulty, weakness, light-headedness and headaches.   Hematological:  Negative for adenopathy. Does not bruise/bleed easily.   Psychiatric/Behavioral:  Negative for confusion, decreased concentration, dysphoric mood, hallucinations, sleep disturbance and suicidal ideas. The patient is not nervous/anxious.    All other systems reviewed and are negative.      Objective:      Physical Exam  Vitals and nursing  note reviewed.   Constitutional:       General: She is not in acute distress.     Appearance: She is well-developed.   Neck:      Thyroid: No thyromegaly.      Vascular: No JVD.   Cardiovascular:      Rate and Rhythm: Normal rate and regular rhythm.      Heart sounds: Normal heart sounds. No murmur heard.  Pulmonary:      Effort: Pulmonary effort is normal. No respiratory distress.      Breath sounds: Normal breath sounds. No wheezing or rales.   Abdominal:      General: There is no distension.      Palpations: Abdomen is soft. There is no mass.      Tenderness: There is no abdominal tenderness. There is no guarding.   Musculoskeletal:      Right lower leg: No edema.      Left lower leg: No edema.   Lymphadenopathy:      Cervical: No cervical adenopathy.   Skin:     Capillary Refill: Capillary refill takes less than 2 seconds.      Findings: No rash.   Neurological:      General: No focal deficit present.      Mental Status: She is alert and oriented to person, place, and time.      Cranial Nerves: No cranial nerve deficit.      Coordination: Coordination normal.   Psychiatric:         Mood and Affect: Mood normal.         Behavior: Behavior normal.         Thought Content: Thought content normal.         Judgment: Judgment normal.       Assessment:       Problem List Items Addressed This Visit       Dyslipidemia    Relevant Medications    atorvastatin (LIPITOR) 10 MG tablet    Primary osteoarthritis of both knees     Other Visit Diagnoses       Well adult exam    -  Primary    Relevant Orders    Lipid Panel    Comprehensive Metabolic Panel            Plan:     Well adult exam  -     Lipid Panel; Future; Expected date: 10/21/2022  -     Comprehensive Metabolic Panel; Future; Expected date: 10/21/2022    Primary osteoarthritis of both knees    Dyslipidemia  -     atorvastatin (LIPITOR) 10 MG tablet; Take 1 tablet (10 mg total) by mouth once daily.  Dispense: 90 tablet; Refill: 3      HMI and vaccines discussed.  Maintain meds. [employer] weight loss. Follow up yearly with labs.     Scribe Attestation:   I, Chris Kebede, am scribing for, and in the presence of, Dr. Antonio Olivera Jr. I performed the above scribed service and the documentation accurately describes the services I performed. I attest to the accuracy of the note.    I, Dr. Antonio Olivera Jr, reviewed documentation as scribed above. I personally performed the services described in this documentation.  I agree that the record reflects my personal performance and is accurate and complete. Antonio Olivera Jr., MD.  10/21/2022

## 2023-02-14 LAB — HEMOCCULT STL QL IA: NEGATIVE

## 2023-02-16 ENCOUNTER — OFFICE VISIT (OUTPATIENT)
Dept: INTERNAL MEDICINE | Facility: CLINIC | Age: 53
End: 2023-02-16
Payer: COMMERCIAL

## 2023-02-16 VITALS
HEIGHT: 63 IN | HEART RATE: 71 BPM | TEMPERATURE: 98 F | DIASTOLIC BLOOD PRESSURE: 86 MMHG | BODY MASS INDEX: 29.95 KG/M2 | SYSTOLIC BLOOD PRESSURE: 122 MMHG | WEIGHT: 169.06 LBS | RESPIRATION RATE: 18 BRPM | OXYGEN SATURATION: 97 %

## 2023-02-16 DIAGNOSIS — J01.10 ACUTE FRONTAL SINUSITIS, RECURRENCE NOT SPECIFIED: ICD-10-CM

## 2023-02-16 DIAGNOSIS — J30.1 SEASONAL ALLERGIC RHINITIS DUE TO POLLEN: ICD-10-CM

## 2023-02-16 DIAGNOSIS — J06.9 URTI (ACUTE UPPER RESPIRATORY INFECTION): Primary | ICD-10-CM

## 2023-02-16 PROBLEM — E66.9 OBESITY, CLASS I, BMI 30-34.9: Status: RESOLVED | Noted: 2017-01-31 | Resolved: 2023-02-16

## 2023-02-16 PROBLEM — E66.811 OBESITY, CLASS I, BMI 30-34.9: Status: RESOLVED | Noted: 2017-01-31 | Resolved: 2023-02-16

## 2023-02-16 PROCEDURE — 99214 PR OFFICE/OUTPT VISIT, EST, LEVL IV, 30-39 MIN: ICD-10-PCS | Mod: S$GLB,,, | Performed by: FAMILY MEDICINE

## 2023-02-16 PROCEDURE — 3074F PR MOST RECENT SYSTOLIC BLOOD PRESSURE < 130 MM HG: ICD-10-PCS | Mod: CPTII,S$GLB,, | Performed by: FAMILY MEDICINE

## 2023-02-16 PROCEDURE — 99999 PR PBB SHADOW E&M-EST. PATIENT-LVL IV: CPT | Mod: PBBFAC,,, | Performed by: FAMILY MEDICINE

## 2023-02-16 PROCEDURE — 3008F PR BODY MASS INDEX (BMI) DOCUMENTED: ICD-10-PCS | Mod: CPTII,S$GLB,, | Performed by: FAMILY MEDICINE

## 2023-02-16 PROCEDURE — 1159F MED LIST DOCD IN RCRD: CPT | Mod: CPTII,S$GLB,, | Performed by: FAMILY MEDICINE

## 2023-02-16 PROCEDURE — 1160F PR REVIEW ALL MEDS BY PRESCRIBER/CLIN PHARMACIST DOCUMENTED: ICD-10-PCS | Mod: CPTII,S$GLB,, | Performed by: FAMILY MEDICINE

## 2023-02-16 PROCEDURE — 99999 PR PBB SHADOW E&M-EST. PATIENT-LVL IV: ICD-10-PCS | Mod: PBBFAC,,, | Performed by: FAMILY MEDICINE

## 2023-02-16 PROCEDURE — 99214 OFFICE O/P EST MOD 30 MIN: CPT | Mod: S$GLB,,, | Performed by: FAMILY MEDICINE

## 2023-02-16 PROCEDURE — 3079F PR MOST RECENT DIASTOLIC BLOOD PRESSURE 80-89 MM HG: ICD-10-PCS | Mod: CPTII,S$GLB,, | Performed by: FAMILY MEDICINE

## 2023-02-16 PROCEDURE — 3074F SYST BP LT 130 MM HG: CPT | Mod: CPTII,S$GLB,, | Performed by: FAMILY MEDICINE

## 2023-02-16 PROCEDURE — 3008F BODY MASS INDEX DOCD: CPT | Mod: CPTII,S$GLB,, | Performed by: FAMILY MEDICINE

## 2023-02-16 PROCEDURE — 1160F RVW MEDS BY RX/DR IN RCRD: CPT | Mod: CPTII,S$GLB,, | Performed by: FAMILY MEDICINE

## 2023-02-16 PROCEDURE — 3079F DIAST BP 80-89 MM HG: CPT | Mod: CPTII,S$GLB,, | Performed by: FAMILY MEDICINE

## 2023-02-16 PROCEDURE — 1159F PR MEDICATION LIST DOCUMENTED IN MEDICAL RECORD: ICD-10-PCS | Mod: CPTII,S$GLB,, | Performed by: FAMILY MEDICINE

## 2023-02-16 RX ORDER — METHYLPREDNISOLONE 4 MG/1
TABLET ORAL
Qty: 21 EACH | Refills: 0 | Status: SHIPPED | OUTPATIENT
Start: 2023-02-16 | End: 2023-04-11

## 2023-02-16 NOTE — PROGRESS NOTES
"Subjective:       Patient ID: Olga Alva is a 52 y.o. female.    Chief Complaint: Sinus Problem, Nasal Congestion, and Mouth Lesions    52-year-old female patient with Patient Active Problem List:     Gynaecologic exam normal     Primary osteoarthritis of both knees     Dyslipidemia  Here reports that patient has been having congestion rhinorrhea and sore throat for the past 4 days and has tested twice for COVID at home which was negative, denies any fever with chills and reported mouth ulcer  Patient has minimal cough from postnasal drip and has been taking Mucinex and Flonase with no relief  Denies any chest tightness or difficulty breathing, nausea vomiting or diarrhea    Review of Systems   Constitutional:  Negative for chills, fatigue and fever.   HENT:  Positive for congestion, postnasal drip, rhinorrhea and sore throat.    Eyes:  Negative for visual disturbance.   Respiratory:  Negative for shortness of breath.    Cardiovascular:  Negative for chest pain and leg swelling.   Gastrointestinal:  Negative for abdominal pain, nausea and vomiting.   Musculoskeletal:  Negative for myalgias.   Skin:  Negative for rash.   Neurological:  Negative for light-headedness and headaches.   Psychiatric/Behavioral:  Negative for sleep disturbance.        /86 (BP Location: Right arm, Patient Position: Sitting, BP Method: Large (Manual))   Pulse 71   Temp 97.7 °F (36.5 °C) (Tympanic)   Resp 18   Ht 5' 3" (1.6 m)   Wt 76.7 kg (169 lb 1.5 oz)   LMP 06/22/2016   SpO2 97%   BMI 29.95 kg/m²   Objective:      Physical Exam  Constitutional:       Appearance: She is well-developed.   HENT:      Head: Normocephalic and atraumatic.      Nose: Rhinorrhea present.      Mouth/Throat:      Mouth: Mucous membranes are moist.      Pharynx: No oropharyngeal exudate or posterior oropharyngeal erythema.      Comments: Noted oral ulcer  Noted frontal sinus tenderness bilaterally  Cardiovascular:      Rate and Rhythm: Normal " rate and regular rhythm.      Heart sounds: Normal heart sounds. No murmur heard.  Pulmonary:      Effort: Pulmonary effort is normal.      Breath sounds: Normal breath sounds. No wheezing.   Abdominal:      General: Bowel sounds are normal.      Palpations: Abdomen is soft.      Tenderness: There is no abdominal tenderness.   Lymphadenopathy:      Cervical: No cervical adenopathy.   Skin:     General: Skin is warm and dry.      Findings: No rash.   Neurological:      Mental Status: She is alert and oriented to person, place, and time.   Psychiatric:         Mood and Affect: Mood normal.         Assessment/Plan:   1. URTI (acute upper respiratory infection)  -     methylPREDNISolone (MEDROL DOSEPACK) 4 mg tablet; Use as directed per package  Dispense: 21 each; Refill: 0    2. Acute frontal sinusitis, recurrence not specified  -     methylPREDNISolone (MEDROL DOSEPACK) 4 mg tablet; Use as directed per package  Dispense: 21 each; Refill: 0    Medrol Dosepak prescribed today for symptomatic relief  Patient was advised to do salt water gargles for oral ulcer and take Tylenol as needed for headache    3. Seasonal allergic rhinitis due to pollen  Advised to take over-the-counter Zyrtec and Flonase and drink adequate fluids

## 2023-02-20 LAB — BCS RECOMMENDATION EXT: NORMAL

## 2023-04-11 ENCOUNTER — PATIENT MESSAGE (OUTPATIENT)
Dept: INTERNAL MEDICINE | Facility: CLINIC | Age: 53
End: 2023-04-11

## 2023-04-11 ENCOUNTER — OFFICE VISIT (OUTPATIENT)
Dept: INTERNAL MEDICINE | Facility: CLINIC | Age: 53
End: 2023-04-11
Payer: COMMERCIAL

## 2023-04-11 DIAGNOSIS — L23.7 POISON IVY DERMATITIS: Primary | ICD-10-CM

## 2023-04-11 PROCEDURE — 99213 OFFICE O/P EST LOW 20 MIN: CPT | Mod: 95,,, | Performed by: NURSE PRACTITIONER

## 2023-04-11 PROCEDURE — 99213 PR OFFICE/OUTPT VISIT, EST, LEVL III, 20-29 MIN: ICD-10-PCS | Mod: 95,,, | Performed by: NURSE PRACTITIONER

## 2023-04-11 RX ORDER — PREDNISONE 20 MG/1
TABLET ORAL
Qty: 10 TABLET | Refills: 0 | Status: SHIPPED | OUTPATIENT
Start: 2023-04-11 | End: 2023-08-31

## 2023-04-11 RX ORDER — TRIAMCINOLONE ACETONIDE 1 MG/G
OINTMENT TOPICAL 2 TIMES DAILY
Qty: 30 G | Refills: 0 | Status: SHIPPED | OUTPATIENT
Start: 2023-04-11 | End: 2023-08-31

## 2023-04-11 NOTE — PROGRESS NOTES
"TELEMEDICINE VIRTUAL VISIT      Visit Details: This visit was a telemedicine virtual visit with synchronous audio and video. Olga reported that her location at the time of this visit was in the [facility]. Olga had the choice to come into office to receive these medical services. Olga chose and consented to receive these medical services by telemedicine.   Subjective:       Patient ID: Olga Alva is a 53 y.o. female.    Chief Complaint: rash, possible poison ivy  Rash  This is a new problem. The current episode started in the past 7 days. The problem has been gradually worsening since onset. The rash is characterized by burning, pain and itchiness. She was exposed to plant contact. Pertinent negatives include no anorexia, congestion, cough, diarrhea, eye pain, facial edema, fatigue, fever, joint pain, nail changes, rhinorrhea, shortness of breath, sore throat or vomiting. Past treatments include anti-itch cream and topical steroids. The treatment provided no relief. Her past medical history is significant for varicella. There is no history of allergies, asthma or eczema.     Pt states that she was recently cutting down a poison ivy plant and she thinks "some of its juices" got on her arm. There are burning patches to the left arm. Using calamine lotion/topical steroid. Not improving much. No systemic complaints          Review of Systems   Constitutional:  Negative for fatigue and fever.   HENT:  Negative for congestion, rhinorrhea and sore throat.    Eyes:  Negative for pain.   Respiratory:  Negative for cough and shortness of breath.    Gastrointestinal:  Negative for anorexia, diarrhea and vomiting.   Musculoskeletal:  Negative for joint pain.   Skin:  Positive for rash. Negative for nail changes.     Objective:      Physical Exam  Skin:     Comments: There a a few noted erythematous patches to affected arm.    Neurological:      Mental Status: She is alert.     She is in no " distress  Assessment:   There were no vitals filed for this visit.      1. Poison ivy dermatitis        Plan:   Poison ivy dermatitis    Other orders  -     predniSONE (DELTASONE) 20 MG tablet; Take 3 pills today, 2 pills x 2 days, 1 pill x 2 days, 1/2 pill x 2 days  Dispense: 10 tablet; Refill: 0  -     triamcinolone acetonide 0.1% (KENALOG) 0.1 % ointment; Apply topically 2 (two) times daily.  Dispense: 30 g; Refill: 0

## 2023-04-19 ENCOUNTER — PATIENT MESSAGE (OUTPATIENT)
Dept: ADMINISTRATIVE | Facility: HOSPITAL | Age: 53
End: 2023-04-19
Payer: COMMERCIAL

## 2023-05-05 ENCOUNTER — PATIENT MESSAGE (OUTPATIENT)
Dept: INTERNAL MEDICINE | Facility: CLINIC | Age: 53
End: 2023-05-05
Payer: COMMERCIAL

## 2023-05-22 ENCOUNTER — PATIENT OUTREACH (OUTPATIENT)
Dept: ADMINISTRATIVE | Facility: HOSPITAL | Age: 53
End: 2023-05-22
Payer: COMMERCIAL

## 2023-08-31 ENCOUNTER — TELEPHONE (OUTPATIENT)
Dept: PRIMARY CARE CLINIC | Facility: CLINIC | Age: 53
End: 2023-08-31
Payer: COMMERCIAL

## 2023-10-16 DIAGNOSIS — E78.5 DYSLIPIDEMIA: ICD-10-CM

## 2023-10-16 NOTE — TELEPHONE ENCOUNTER
Care Due:                  Date            Visit Type   Department     Provider  --------------------------------------------------------------------------------                                SAME DAY -                              ESTABLISHED   [facility]  Brenna Jonas  Last Visit: 02-      PATIENT      MEDICINE       Federico  Next Visit: None Scheduled  None         None Found                                                            Last  Test          Frequency    Reason                     Performed    Due Date  --------------------------------------------------------------------------------    CMP.........  12 months..  atorvastatin.............  10-   10-    Lipid Panel.  12 months..  atorvastatin.............  10-   10-    [employer] Embedded Care Due Messages. Reference number: [id number].   10/16/2023 12:14:53 AM CDT

## 2023-10-17 RX ORDER — ATORVASTATIN CALCIUM 10 MG/1
10 TABLET, FILM COATED ORAL DAILY
Qty: 90 TABLET | Refills: 0 | Status: SHIPPED | OUTPATIENT
Start: 2023-10-17 | End: 2024-01-16

## 2023-10-17 NOTE — TELEPHONE ENCOUNTER
Refill Routing Note   Medication(s) are not appropriate for processing by [facility] for the following reason(s):      Required labs outdated: CMP, Lipid panel (ordered, may require staff scheduling assistance)    [facility] action(s):  Defer Care Due:  Labs due   Medication Therapy Plan:         Appointments  past 12m or future 3m with PCP    Date Provider   Last Visit   10/21/2022 Antonio Olivera MD   Next Visit   Visit date not found Antonio Olivera MD   ED visits in past 90 days: 0        Note composed:8:44 PM 10/16/2023

## 2023-11-21 ENCOUNTER — OFFICE VISIT (OUTPATIENT)
Dept: INTERNAL MEDICINE | Facility: CLINIC | Age: 53
End: 2023-11-21
Payer: COMMERCIAL

## 2023-11-21 ENCOUNTER — PATIENT MESSAGE (OUTPATIENT)
Dept: INTERNAL MEDICINE | Facility: CLINIC | Age: 53
End: 2023-11-21

## 2023-11-21 VITALS
HEIGHT: 63 IN | SYSTOLIC BLOOD PRESSURE: 126 MMHG | OXYGEN SATURATION: 97 % | DIASTOLIC BLOOD PRESSURE: 84 MMHG | WEIGHT: 177.25 LBS | TEMPERATURE: 98 F | BODY MASS INDEX: 31.41 KG/M2 | HEART RATE: 67 BPM

## 2023-11-21 DIAGNOSIS — M17.0 PRIMARY OSTEOARTHRITIS OF BOTH KNEES: ICD-10-CM

## 2023-11-21 DIAGNOSIS — Z00.00 WELL ADULT EXAM: Primary | ICD-10-CM

## 2023-11-21 DIAGNOSIS — E78.5 DYSLIPIDEMIA: ICD-10-CM

## 2023-11-21 DIAGNOSIS — E66.9 OBESITY, CLASS I, BMI 30-34.9: ICD-10-CM

## 2023-11-21 PROCEDURE — 3074F SYST BP LT 130 MM HG: CPT | Mod: CPTII,S$GLB,, | Performed by: PEDIATRICS

## 2023-11-21 PROCEDURE — 3008F BODY MASS INDEX DOCD: CPT | Mod: CPTII,S$GLB,, | Performed by: PEDIATRICS

## 2023-11-21 PROCEDURE — 1160F RVW MEDS BY RX/DR IN RCRD: CPT | Mod: CPTII,S$GLB,, | Performed by: PEDIATRICS

## 2023-11-21 PROCEDURE — 1159F PR MEDICATION LIST DOCUMENTED IN MEDICAL RECORD: ICD-10-PCS | Mod: CPTII,S$GLB,, | Performed by: PEDIATRICS

## 2023-11-21 PROCEDURE — 99999 PR PBB SHADOW E&M-EST. PATIENT-LVL IV: ICD-10-PCS | Mod: PBBFAC,,, | Performed by: PEDIATRICS

## 2023-11-21 PROCEDURE — 3074F PR MOST RECENT SYSTOLIC BLOOD PRESSURE < 130 MM HG: ICD-10-PCS | Mod: CPTII,S$GLB,, | Performed by: PEDIATRICS

## 2023-11-21 PROCEDURE — 99396 PR PREVENTIVE VISIT,EST,40-64: ICD-10-PCS | Mod: S$GLB,,, | Performed by: PEDIATRICS

## 2023-11-21 PROCEDURE — 99396 PREV VISIT EST AGE 40-64: CPT | Mod: S$GLB,,, | Performed by: PEDIATRICS

## 2023-11-21 PROCEDURE — 99999 PR PBB SHADOW E&M-EST. PATIENT-LVL IV: CPT | Mod: PBBFAC,,, | Performed by: PEDIATRICS

## 2023-11-21 PROCEDURE — 1159F MED LIST DOCD IN RCRD: CPT | Mod: CPTII,S$GLB,, | Performed by: PEDIATRICS

## 2023-11-21 PROCEDURE — 3008F PR BODY MASS INDEX (BMI) DOCUMENTED: ICD-10-PCS | Mod: CPTII,S$GLB,, | Performed by: PEDIATRICS

## 2023-11-21 PROCEDURE — 1160F PR REVIEW ALL MEDS BY PRESCRIBER/CLIN PHARMACIST DOCUMENTED: ICD-10-PCS | Mod: CPTII,S$GLB,, | Performed by: PEDIATRICS

## 2023-11-21 PROCEDURE — 3079F DIAST BP 80-89 MM HG: CPT | Mod: CPTII,S$GLB,, | Performed by: PEDIATRICS

## 2023-11-21 PROCEDURE — 3079F PR MOST RECENT DIASTOLIC BLOOD PRESSURE 80-89 MM HG: ICD-10-PCS | Mod: CPTII,S$GLB,, | Performed by: PEDIATRICS

## 2023-11-21 RX ORDER — PROGESTERONE 100 MG/1
100 CAPSULE ORAL DAILY
COMMUNITY
Start: 2023-08-30 | End: 2024-01-26 | Stop reason: SDUPTHER

## 2023-11-21 RX ORDER — TIRZEPATIDE 2.5 MG/.5ML
2.5 INJECTION, SOLUTION SUBCUTANEOUS
Qty: 4 PEN | Refills: 11 | Status: SHIPPED | OUTPATIENT
Start: 2023-11-21 | End: 2023-11-22 | Stop reason: SDUPTHER

## 2023-11-21 NOTE — PROGRESS NOTES
Subjective     Patient ID: Olga Alva is a 53 y.o. female.    Chief Complaint: Annual Exam    Olga Alva is a 53 y.o. female who presents to the clinic for annual.       1) Lipids:Tolerating lipitor, is successful working at [employer], weight is up  2) DJD: knees are stable, currently not causing trouble.  3)Obesity: somewhat following D&E, weight is up, pt inquires about weight loss medications                 LABS NEEDED       Review of Systems   Constitutional:  Negative for chills, diaphoresis, fatigue and fever.   Respiratory:  Negative for cough and shortness of breath.    Cardiovascular:  Negative for chest pain.   Gastrointestinal:  Negative for abdominal pain, anal bleeding, constipation, diarrhea, nausea and vomiting.   Endocrine: Negative for cold intolerance, heat intolerance, polydipsia, polyphagia and polyuria.   Musculoskeletal:  Positive for arthralgias.   All other systems reviewed and are negative.         Objective     Physical Exam  Constitutional:       General: She is not in acute distress.     Appearance: Normal appearance. She is obese. She is not ill-appearing or toxic-appearing.   Cardiovascular:      Rate and Rhythm: Normal rate and regular rhythm.      Pulses: Normal pulses.      Heart sounds: Normal heart sounds. No murmur heard.     No friction rub.   Pulmonary:      Effort: Pulmonary effort is normal.      Breath sounds: Normal breath sounds.   Abdominal:      General: There is no distension.      Palpations: There is no mass.      Tenderness: There is no abdominal tenderness. There is no guarding or rebound.      Hernia: No hernia is present.   Neurological:      Mental Status: She is alert and oriented to person, place, and time.   Psychiatric:         Mood and Affect: Mood normal.         Behavior: Behavior normal.         Thought Content: Thought content normal.         Judgment: Judgment normal.            Assessment and Plan     1. Well adult exam  -     Hemoglobin A1C;  Future; Expected date: 11/21/2023  -     Lipid Panel; Future; Expected date: 11/21/2023  -     Comprehensive Metabolic Panel; Future; Expected date: 11/21/2023  -     Lipid Panel; Future; Expected date: 11/21/2023  -     Comprehensive Metabolic Panel; Future; Expected date: 11/21/2023    2. Obesity, Class I, BMI 30-34.9  -     tirzepatide (MOUNJARO) 2.5 mg/0.5 mL PnIj; Inject 2.5 mg into the skin every 7 days.  Dispense: 4 pen ; Refill: 11    3. Dyslipidemia    4. Primary osteoarthritis of both knees        HMI discussed with pt. Flu and covid vaccines recommended. Shingles vaccinated. Fasting labs scheduled for tomorrow morning. Due for mammogram, scheduled appointment in February. [facility] from [facility]. GLP1 inhibitor and its RB/CI/SE discussed with Pt. If covered by insurance start mounjaro 2.5 mg and follow up in 4-6 weeks for medication adjustment. Otherwise follow up in a year with labs.            Follow up in about 1 year (around 11/21/2024).

## 2023-11-22 ENCOUNTER — LAB VISIT (OUTPATIENT)
Dept: LAB | Facility: HOSPITAL | Age: 53
End: 2023-11-22
Attending: PEDIATRICS
Payer: COMMERCIAL

## 2023-11-22 DIAGNOSIS — Z00.00 WELL ADULT EXAM: ICD-10-CM

## 2023-11-22 LAB
ALBUMIN SERPL BCP-MCNC: 3.9 G/DL (ref 3.5–5.2)
ALP SERPL-CCNC: 66 U/L (ref 55–135)
ALT SERPL W/O P-5'-P-CCNC: 22 U/L (ref 10–44)
ANION GAP SERPL CALC-SCNC: 9 MMOL/L (ref 8–16)
AST SERPL-CCNC: 20 U/L (ref 10–40)
BILIRUB SERPL-MCNC: 0.5 MG/DL (ref 0.1–1)
BUN SERPL-MCNC: 12 MG/DL (ref 6–20)
CALCIUM SERPL-MCNC: 9.7 MG/DL (ref 8.7–10.5)
CHLORIDE SERPL-SCNC: 106 MMOL/L (ref 95–110)
CHOLEST SERPL-MCNC: 162 MG/DL (ref 120–199)
CHOLEST/HDLC SERPL: 3.8 {RATIO} (ref 2–5)
CO2 SERPL-SCNC: 24 MMOL/L (ref 23–29)
CREAT SERPL-MCNC: 1.1 MG/DL (ref 0.5–1.4)
EST. GFR  (NO RACE VARIABLE): 59.3 ML/MIN/1.73 M^2
ESTIMATED AVG GLUCOSE: 100 MG/DL (ref 68–131)
GLUCOSE SERPL-MCNC: 97 MG/DL (ref 70–110)
HBA1C MFR BLD: 5.1 % (ref 4–5.6)
HDLC SERPL-MCNC: 43 MG/DL (ref 40–75)
HDLC SERPL: 26.5 % (ref 20–50)
LDLC SERPL CALC-MCNC: 102.4 MG/DL (ref 63–159)
NONHDLC SERPL-MCNC: 119 MG/DL
POTASSIUM SERPL-SCNC: 4.6 MMOL/L (ref 3.5–5.1)
PROT SERPL-MCNC: 7.4 G/DL (ref 6–8.4)
SODIUM SERPL-SCNC: 139 MMOL/L (ref 136–145)
TRIGL SERPL-MCNC: 83 MG/DL (ref 30–150)

## 2023-11-22 PROCEDURE — 83036 HEMOGLOBIN GLYCOSYLATED A1C: CPT | Performed by: PEDIATRICS

## 2023-11-22 PROCEDURE — 80061 LIPID PANEL: CPT | Performed by: PEDIATRICS

## 2023-11-22 PROCEDURE — 36415 COLL VENOUS BLD VENIPUNCTURE: CPT | Performed by: PEDIATRICS

## 2023-11-22 PROCEDURE — 80053 COMPREHEN METABOLIC PANEL: CPT | Performed by: PEDIATRICS

## 2023-11-22 RX ORDER — TIRZEPATIDE 2.5 MG/.5ML
2.5 INJECTION, SOLUTION SUBCUTANEOUS
Qty: 4 PEN | Refills: 11 | Status: SHIPPED | OUTPATIENT
Start: 2023-11-22 | End: 2024-01-08 | Stop reason: SDUPTHER

## 2023-11-22 NOTE — TELEPHONE ENCOUNTER
Care Due:                  Date            Visit Type   Department     Provider  --------------------------------------------------------------------------------                                EP -                              PRIMARY      [facility]  Last Visit: 11-      CARE ([facility])   [facility]       Antonio Olivera                              EP -                              PRIMARY      [facility]  Next Visit: 11-      CARE ([facility])   [facility]                                                            Last  Test          Frequency    Reason                     Performed    Due Date  --------------------------------------------------------------------------------    HBA1C.......  6 months...  tirzepatide..............  Not Found    Overdue    [employer] Embedded Care Due Messages. Reference number: [id number].   11/22/2023 9:03:36 AM CST

## 2023-11-24 ENCOUNTER — LAB VISIT (OUTPATIENT)
Dept: LAB | Facility: HOSPITAL | Age: 53
End: 2023-11-24
Attending: FAMILY MEDICINE
Payer: COMMERCIAL

## 2023-11-24 ENCOUNTER — OFFICE VISIT (OUTPATIENT)
Dept: PRIMARY CARE CLINIC | Facility: CLINIC | Age: 53
End: 2023-11-24
Payer: COMMERCIAL

## 2023-11-24 VITALS
BODY MASS INDEX: 31.41 KG/M2 | OXYGEN SATURATION: 97 % | HEIGHT: 63 IN | SYSTOLIC BLOOD PRESSURE: 124 MMHG | HEART RATE: 83 BPM | WEIGHT: 177.25 LBS | DIASTOLIC BLOOD PRESSURE: 88 MMHG | TEMPERATURE: 97 F

## 2023-11-24 DIAGNOSIS — Z83.3 FAMILY HISTORY OF DIABETES MELLITUS (DM): ICD-10-CM

## 2023-11-24 DIAGNOSIS — E66.09 CLASS 1 OBESITY DUE TO EXCESS CALORIES WITH SERIOUS COMORBIDITY AND BODY MASS INDEX (BMI) OF 31.0 TO 31.9 IN ADULT: ICD-10-CM

## 2023-11-24 DIAGNOSIS — M17.0 PRIMARY OSTEOARTHRITIS OF BOTH KNEES: ICD-10-CM

## 2023-11-24 DIAGNOSIS — E78.5 DYSLIPIDEMIA: ICD-10-CM

## 2023-11-24 DIAGNOSIS — E78.5 DYSLIPIDEMIA: Primary | ICD-10-CM

## 2023-11-24 PROBLEM — E66.811 CLASS 1 OBESITY DUE TO EXCESS CALORIES WITH SERIOUS COMORBIDITY AND BODY MASS INDEX (BMI) OF 31.0 TO 31.9 IN ADULT: Status: ACTIVE | Noted: 2023-11-24

## 2023-11-24 LAB
INSULIN COLLECTION INTERVAL: NORMAL
INSULIN SERPL-ACNC: 6.5 UU/ML

## 2023-11-24 PROCEDURE — 99999 PR PBB SHADOW E&M-EST. PATIENT-LVL V: CPT | Mod: PBBFAC,,, | Performed by: FAMILY MEDICINE

## 2023-11-24 PROCEDURE — 3079F DIAST BP 80-89 MM HG: CPT | Mod: CPTII,S$GLB,, | Performed by: FAMILY MEDICINE

## 2023-11-24 PROCEDURE — 3008F BODY MASS INDEX DOCD: CPT | Mod: CPTII,S$GLB,, | Performed by: FAMILY MEDICINE

## 2023-11-24 PROCEDURE — 1159F PR MEDICATION LIST DOCUMENTED IN MEDICAL RECORD: ICD-10-PCS | Mod: CPTII,S$GLB,, | Performed by: FAMILY MEDICINE

## 2023-11-24 PROCEDURE — 1160F PR REVIEW ALL MEDS BY PRESCRIBER/CLIN PHARMACIST DOCUMENTED: ICD-10-PCS | Mod: CPTII,S$GLB,, | Performed by: FAMILY MEDICINE

## 2023-11-24 PROCEDURE — 83525 ASSAY OF INSULIN: CPT | Performed by: FAMILY MEDICINE

## 2023-11-24 PROCEDURE — 99215 OFFICE O/P EST HI 40 MIN: CPT | Mod: S$GLB,,, | Performed by: FAMILY MEDICINE

## 2023-11-24 PROCEDURE — 3044F PR MOST RECENT HEMOGLOBIN A1C LEVEL <7.0%: ICD-10-PCS | Mod: CPTII,S$GLB,, | Performed by: FAMILY MEDICINE

## 2023-11-24 PROCEDURE — 1160F RVW MEDS BY RX/DR IN RCRD: CPT | Mod: CPTII,S$GLB,, | Performed by: FAMILY MEDICINE

## 2023-11-24 PROCEDURE — 3044F HG A1C LEVEL LT 7.0%: CPT | Mod: CPTII,S$GLB,, | Performed by: FAMILY MEDICINE

## 2023-11-24 PROCEDURE — 1159F MED LIST DOCD IN RCRD: CPT | Mod: CPTII,S$GLB,, | Performed by: FAMILY MEDICINE

## 2023-11-24 PROCEDURE — 3074F PR MOST RECENT SYSTOLIC BLOOD PRESSURE < 130 MM HG: ICD-10-PCS | Mod: CPTII,S$GLB,, | Performed by: FAMILY MEDICINE

## 2023-11-24 PROCEDURE — 99215 PR OFFICE/OUTPT VISIT, EST, LEVL V, 40-54 MIN: ICD-10-PCS | Mod: S$GLB,,, | Performed by: FAMILY MEDICINE

## 2023-11-24 PROCEDURE — 36415 COLL VENOUS BLD VENIPUNCTURE: CPT | Performed by: FAMILY MEDICINE

## 2023-11-24 PROCEDURE — 99999 PR PBB SHADOW E&M-EST. PATIENT-LVL V: ICD-10-PCS | Mod: PBBFAC,,, | Performed by: FAMILY MEDICINE

## 2023-11-24 PROCEDURE — 3074F SYST BP LT 130 MM HG: CPT | Mod: CPTII,S$GLB,, | Performed by: FAMILY MEDICINE

## 2023-11-24 PROCEDURE — 3079F PR MOST RECENT DIASTOLIC BLOOD PRESSURE 80-89 MM HG: ICD-10-PCS | Mod: CPTII,S$GLB,, | Performed by: FAMILY MEDICINE

## 2023-11-24 PROCEDURE — 3008F PR BODY MASS INDEX (BMI) DOCUMENTED: ICD-10-PCS | Mod: CPTII,S$GLB,, | Performed by: FAMILY MEDICINE

## 2023-11-24 RX ORDER — NALTREXONE HYDROCHLORIDE 50 MG/1
TABLET, FILM COATED ORAL
Qty: 15 TABLET | Refills: 0 | Status: SHIPPED | OUTPATIENT
Start: 2023-11-24 | End: 2024-01-04 | Stop reason: SDUPTHER

## 2023-11-24 NOTE — PATIENT INSTRUCTIONS
"Please let me know how you are doing with the medication.       Access [employer] for additional drug information, tools, and databases.  Copyright 3668-8308 [employer] All rights reserved.  Contributor Disclosures  (For additional information see "Naltrexone: Drug information")    You must carefully read the "Consumer Information Use and Disclaimer" below in order to understand and correctly use this information.  Brand Names: US  Vivitrol    Brand Names: Anna  APO-Naltrexone;     ReVia    What is this drug used for?  It is used to help keep you alcohol-free.  It is used to help keep you opioid-free. Opioid drugs include heroin and prescription pain drugs like oxycodone and morphine.  It may be given to you for other reasons. Talk with the doctor.    What do I need to tell my doctor BEFORE I take this drug?  If you are allergic to this drug; any part of this drug; or any other drugs, foods, or substances. Tell your doctor about the allergy and what signs you had.  If you are taking an opioid drug like morphine or oxycodone, are addicted to an opioid drug, or are having withdrawal signs.  If you have taken a pain drug within the past 7 to 14 days.  This is not a list of all drugs or health problems that interact with this drug.  Tell your doctor and pharmacist about all of your drugs (prescription or OTC, natural products, vitamins) and health problems. You must check to make sure that it is safe for you to take this drug with all of your drugs and health problems. Do not start, stop, or change the dose of any drug without checking with your doctor.    What are some things I need to know or do while I take this drug?  All products:  Tell all of your health care providers that you take this drug. This includes your doctors, nurses, pharmacists, and dentists.  Avoid driving and doing other tasks or actions that call for you to be alert until you see how this drug affects you.  Have blood work checked as you " have been told by the doctor. Talk with the doctor.  This drug may affect certain lab tests. Tell all of your health care providers and lab workers that you take this drug.  Avoid drinking alcohol while taking this drug.  Do not take opioid drugs while you are taking this drug. Opioid drugs will not work. Do not take more opioid drugs to try to get them to work. Doing this may cause severe injury, coma, or death.  A drug called naloxone can be used to help treat an opioid overdose. Your doctor may order naloxone for you to keep with you if it is needed. If you have questions about how to get or use naloxone, talk with your doctor or pharmacist. If you think there has been an opioid overdose, get medical care right away even if naloxone has been used.  If you are addicted to opioid drugs and are given this drug, you may have signs of withdrawal. If you have questions, talk with your doctor.  Tell your doctor if you are pregnant, plan on getting pregnant, or are breast-feeding. You will need to talk about the benefits and risks to you and the baby.  Tablets:  Have patient safety card with you at all times.  People taking this drug to keep an opioid-free state may get more effects from opioid drugs when this drug is stopped. Even low amounts of opioid drugs or amounts that you have used before may lead to overdose and death. If you have questions, talk with your doctor.  Injection:  After you get a dose of this drug, its blocking effect on opioids will go away over time. Low amounts of opioid drugs or amounts that you have used before may lead to overdose and death. This effect may also be seen when it is time for your next dose of this drug, if you miss a dose, if you stop treatment, or if you have gone through treatment and are opioid-free.  Very bad skin problems have happened where the shot was given. Sometimes surgery was needed for these skin problems. Talk with the doctor.  A type of lung infection caused by an  allergic reaction has happened with this drug. This may need to be treated in a hospital.    What are some side effects that I need to call my doctor about right away?  WARNING/CAUTION: Even though it may be rare, some people may have very bad and sometimes deadly side effects when taking a drug. Tell your doctor or get medical help right away if you have any of the following signs or symptoms that may be related to a very bad side effect:  All products:  Signs of an allergic reaction, like rash; hives; itching; red, swollen, blistered, or peeling skin with or without fever; wheezing; tightness in the chest or throat; trouble breathing, swallowing, or talking; unusual hoarseness; or swelling of the mouth, face, lips, tongue, or throat.  Signs of liver problems like dark urine, tiredness, decreased appetite, upset stomach or stomach pain, light-colored stools, throwing up, or yellow skin or eyes.  Signs of high blood pressure like very bad headache or dizziness, passing out, or change in eyesight.  New or worse behavior or mood changes like depression or thoughts of suicide.  Feeling confused.  Trouble breathing, slow breathing, or shallow breathing.  Sex problems in males.  Injection:  Signs of lung or breathing problems like shortness of breath or other trouble breathing, cough, or fever.  Area that feels hard, blisters, dark scab, lumps, open wound, pain, swelling, or other very bad skin irritation where the shot was given.    What are some other side effects of this drug?  All drugs may cause side effects. However, many people have no side effects or only have minor side effects. Call your doctor or get medical help if any of these side effects or any other side effects bother you or do not go away:  All products:  Feeling nervous and excitable.  Anxiety.  Headache.  Muscle cramps.  Constipation, diarrhea, stomach pain, upset stomach, throwing up, or decreased appetite.  Unusual thirst.  Trouble  sleeping.  Feeling dizzy, sleepy, tired, or weak.  Back, muscle, or joint pain.  Signs of a common cold.  Tooth pain.  Injection:  Irritation where the shot is given.  Nose or throat irritation.  Dry mouth.  These are not all of the side effects that may occur. If you have questions about side effects, call your doctor. Call your doctor for medical advice about side effects.  You may report side effects to your national health agency.    How is this drug best taken?  Use this drug as ordered by your doctor. Read all information given to you. Follow all instructions closely.  Tablets:  Keep taking this drug as you have been told by your doctor or other health care provider, even if you feel well.  Injection:  It is given as a shot into a muscle.    What do I do if I miss a dose?  Tablets:  Take a missed dose as soon as you think about it.  If it is close to the time for your next dose, skip the missed dose and go back to your normal time.  Do not take 2 doses at the same time or extra doses.  Injection:  Call your doctor to find out what to do.    How do I store and/or throw out this drug?  Tablets:  Store at room temperature in a dry place. Do not store in a bathroom.  Injection:  If you need to store this drug at home, talk with your doctor, nurse, or pharmacist about how to store it.  All products:  Keep all drugs in a safe place. Keep all drugs out of the reach of children and pets.  Throw away unused or  drugs. Do not flush down a toilet or pour down a drain unless you are told to do so. Check with your pharmacist if you have questions about the best way to throw out drugs. There may be drug take-back programs in your area.    General drug facts  If your symptoms or health problems do not get better or if they become worse, call your doctor.  Do not share your drugs with others and do not take anyone else's drugs.  Some drugs may have another patient information leaflet. If you have any questions about  this drug, please talk with your doctor, nurse, pharmacist, or other health care provider.  If you think there has been an overdose, call your poison control center or get medical care right away. Be ready to tell or show what was taken, how much, and when it happened.    Last Reviewed Date  2021-03-30  Consumer Information Use and Disclaimer  This generalized information is a limited summary of diagnosis, treatment, and/or medication information. It is not meant to be comprehensive and should be used as a tool to help the user understand and/or assess potential diagnostic and treatment options. It does NOT include all information about conditions, treatments, medications, side effects, or risks that may apply to a specific patient. It is not intended to be medical advice or a substitute for the medical advice, diagnosis, or treatment of a health care provider based on the health care provider's examination and assessment of a patient's specific and unique circumstances. Patients must speak with a health care provider for complete information about their health, medical questions, and treatment options, including any risks or benefits regarding use of medications. This information does not endorse any treatments or medications as safe, effective, or approved for treating a specific patient. UpToDate, Inc. and its affiliates disclaim any warranty or liability relating to this information or the use thereof. The use of this information is governed by the Terms of Use, available at https://www.Mirriad.com/en/know/clinical-effectiveness-terms.    © 2023 UpToDate, Inc. and its affiliates and/or licensors. All rights reserved.  Use of OpenROV is subject to the Terms of Use.  Topic 34951 Version 148.0      Goal: < 25 grams added sugar daily          PRODUCE  [] All fresh fruit   [] All fresh vegetables   [] All fresh herbs  [] All herb purees + pastes  [] Pre-spiralized vegetable noodles   [] Steam-In-The-Bag  "begetables  [] Riced cauliflower  [] Jicama sticks  [] Love Beets  all varieties  [] Wholly Guacamole  all varieties  [] Hummus  all varieties, chickpea + vegetable  [] Tofu Shirataki noodles    [] Tofu  all varieties  [] Tempeh  all varieties    PROTEIN  CHICKEN   [] Boneless, skinless breasts  [] Boneless, skinless thighs  [] Ground chicken breast, at least 93% lean  [] Chicken breast cutlet  [] Aidell's  Chicken Sausage + Chicken Meatballs    TURKEY   [] Turkey breast tenderloin   [] Ground turkey breast, at least 93% lean  [] [place] Naturals  Turkey Sausage    BEEF  [] Tenderloin  [] Sirloin  [] Top Loin  [] Flank Steak  [] Round Steak  [] Filet  [] Lean ground beef, at least 93% lean + grass-fed preferable    PORK  [] Tenderloin  [] Pork Chop  [] Center Cut  [] [place] Naturals  No-Sugar Joshi    BISON  [] [place]ima  90 - 95% lean    SEAFOOD  [] All fresh fish + seafood; locally sourced when possible  [] Smoked salmon    HEAT + EAT ENTREES   [] Isauro's Natural Foods  Chicken, Pork, Beef  [] Quentin  "All Natural" Grilled Chicken Breast + Strips, all varieties    SAUCES SPREADS + DIPS  [] Bitchin Sauce  Original, Chipotle, Cilantro [place]  [] Sarbjit's Kitchen  Tzatziki Yogurt Dip, Babaganoush, Hummus  [] Wholly Guacamole  all varieties  [] Hummus  all varieties  [] Harlan Gringo Salsa  all varieties  [] Mrs. James's Salsa  all varieties  [] Stubb's All Natural BBQ Sauce  [] Primal Kitchen  Evans, Ketchup, BBQ Sauce  [] Primal Kitchen Pasta Sauce  Roasted Garlic, Tomato Basil, no-dairy Vodka Sauce  [] Sal & Corinna's  HeartSmart Pasta Sauce    DAIRY/DAIRY SUBSTITUTES/EGGS  EGGS   [] All eggs  cage-free, pasture-raise preferable  [] Crepini  egg wraps  [] Vital Farms  Pasture-Raised Egg Bites  [] JUST Egg [vegan]     CREAMERS   [] [place]  Better Half, original + vanilla unsweetened  [] NutPods  all varieties    MILK   [] Horizon Organic  all varieties except chocolate  [] Organic Valley "  all varieties except chocolate  [] Organic Valley  ultra-filtered, reduced fat milk     PLANT_BASED MILK ALTERNATIVES  [] All unsweetened almond milks  original, vanilla + chocolate  [] Ripple  unsweetened   [] Milkadamia  original +_ vanilla, unsweetened   [] Forager  original + vanilla, unsweetened   [] Silk Organic  soy milk, unsweetened  [] Oatly  unsweetened  [] Califia  regular + protein-fortified oat milk, unsweetened     CHEESES  [] Regular or reduced fat cheeses  [] BelGioso  Fresh Mozzarella Snack Packs, Parmesan Power-full Snack   [] Goat cheese  [] Fresh mozzarella  [] String cheese  all varieties  [] Laura Cottage Cheese  [] Justina's Cultured Cottage Cheese  [] Gaby Life 'Just Like Mozzarella'  plant-based shreds and other varieties  [] Parmela Creamery  plant-based shredded cheese    YOGURT  [] Fage  2% low-fat, plain  [] Siggi's  plain, vanilla  [] Chobani Greek  nonfat + whole milk yogurt, plain   [] Chobani Less Sugar  all flavored varieties   [] Oikos Greek  nonfat, plain  [] Two Good  all varieties   [] Vietnamese Provisions  plain  [] [place] Organic  low-fat + nonfat, plain  [] [facility]  goat milk yogurt, plain  [] Kefit  unsweetened, plain  [] Forager  Greek style unsweetened, plain [dairy-free]  [] [place]  unsweetened Greek style, plain [dairy-free]  [] [facility]  almond milk yogurt, vanilla or plain, unsweetened [dairy-free]    FREEZER SECTION  FROZEN VEGGIES  [] All plain frozen veggies + greens [e.g. broccoli, brussels, carrots, okra, mushrooms, zucchini, yellow squash, butternut squash, kale, spinach, sonia greens]  [] Riced veggies [e.g. cauliflower, broccoli, butternut squash]  [] Edamame  all varieties  [] Green Giant  [] Veggie Spirals  [] Marinated Veggies [e.g. eggplant, peppers, zucchini]  [] Simply Steam [place] Sprouts  [] Birds Eye  [] Power Blend Italian Style  lentils, broccoli, kale, zucchini  []  [place] Sprouts &  Carrots  [] Oven Roasters Broccoli & Cauliflower  [] [place] Blend  [] Tattooed   [] Green Bean Blend  [] Farmer's Market Ratatouille  [] Butter Balsamic Glazed Vegetables  [] Riced Cauliflower & Quinoa Mediterranean Style  [] Mila's Good Life  Southern Style Greens [sauteed kale + onion]    FROZEN FRUITS  [] All unsweetened frozen fruits  all varieties  [] Dole Fruits & Veggie Blends  Berries 'n Kale  [] Dole Mix-ins  Triple Berry     FROZEN ENTREES  [] The Good Kitchen meals  all varieties [ e.g. Chili Lime Chicken Over Riced Cauliflower]  [] Premium Paleo  Not Ronaldo Momma's Meatloaf  [] Primal Kitchen  Chicken Pesto + Steak Fajitas w/ Peppers & Onions  [] Eating Well Frozen Entrees  Butter Chicken Masala, Steak Carne Asada, Creamy Pesto Chicken, Chicken + Wild Rice Stroganoff, Yellow Horner Chicken, Sun-dried Tomato Chicken, Chicken Lo Mein  [] Realgood Entree Bowls  [place] Islander Inspired Beef Bowl over Riced Cauliflower, Chicken Burrito Bowl   [] Great Karma Coconut Horner  [] Vita's  [place] Priti with Black Beans, Vegetable Lasagna  [] Kashi Mayan [place] Bake  [] Healthy Choice  Simply Steamers Chicken Fried Rice  [] Basil Pesto Chicken & [place] Style Pork Power Bowls  [] Tattooed   Enchilada Bowl  [] Shashank Farms  Spicy Black Bean Burgers    FROZEN PIZZAS  [] Cauli'flour Foods  Cauliflower Pizza Crusts  [] Outer Aisle  Cauliflower Crust  [] Vita's  Veggie Crust Cheese Pizza  [] Quest Pizza     VEGETARIAN PRODUCTS  [] Beyond Meat  ground 'meat' + grilled 'chicken' strips  [] Tofurkey  Original Italian Sausage + Original Tempeh  [] Gardein  Beefless Ground + Meatless Meatballs  [] Shashank Farms Grillers  Original Burger, Crumbles, Meatballs    ICE CREAMS + FROZEN DESSERTS  [] Halo Top  regular + keto series, pops  [] Rebel  ice cream + dessert sandwiches  [] Enlightened  ice cream + bars  [] Nightfood  ice cream  [] Realgood  ice cream  [] Arctic Zero Fit  frozen pint  []  The Frozen Farmer  sorbets  [] Wholly Rollies  Protein Balls, all varieties  [] Dream Pops  Coconut Latte    FROZEN BREAKFASTS  [] Realgood  Breakfast Sandwiches on Cauliflower Cheesy Bread  [] Rebel  ice cream + dessert sandwiches  [] Enlightened  ice cream + bars  [] Nightfood  ice cream  [] Realgood  ice cream  [] Arctic Zero Fit  frozen pint  [] The Frozen Farmer  sorbets  [] Wholly Rollies  Protein Balls, all varieties  [] Dream Pops  Coconut Latte    BREADS/BUNS/WRAPS  [] Vu Bread: All Types - In Freezer Section   [] Flat Out Light Wraps - All Varieties   [] Flat Out Protein Up Carb Down Flat Bread   [] Kontos Whole Wheat Pocket Amada   [] Monroe ROSA 100% Whole Wheat Tortillas   [] LaTortilla Factory Tortillas - Smart & Delicious; 50 or 80-calorie   [] Nature's Own 100% Whole Wheat Bread   [] Orowheat Healthful - 100% Whole Wheat Slice Bread and Draper Thins   [] Orowheat Healthful - Whole Wheat Nuts & Grain Bread; Flax & Seed Bread   [] Pepperidge Farm Natural Whole Grain 15 Bread   [] Pepperidge Farm Natural Whole Grain English Muffin - 100% Whole Wheat   [] Pepperidge Farm Very Thin 100% Whole Wheat   [] Gayle Fuentes 45 Calories and Delightful   [] Agustin 100% Whole Wheat Thin-Sliced Bagels and English Muffins   [] Western Bagel: Perfect 10     GLUTEN FREE  [] Ronald's Gluten Free Bread   [] [place] Bakehouse 7-Grain Gluten Free Bread     LEGUME PASTA   [] Explore Asian Organic Black Bean Spaghetti   [] Modern Table   [] Tolerant Foods       NUT BUTTERS & JELLIES    NUT BUTTERS   [] Better'n Chocolate: Coconut Chocolate Peanut Butter Spread   [] Better'n Peanut Butter - All Types   [] Earth Balance Coconut and Peanut Spread   [] Nixon's Nut [place]   [] MaraNatha: All Natural Roasted Cashew Butter - [place] or Creamy   [] MaraNatha: Roasted Peanut Butter   [] Nuts 'N More Peanut [place] - All Flavors   [] PB2 Powder - Original or Chocolate   [] Skippy Natural - Creamy, Super Chunk   [] Smart  Balance Peanut Butter - [place] or Creamy   [] Peanut Butter & Company:   [] Smooth , Crunch Time, The Heat Is On, Old Fashioned Smooth, Mighty Nut- Powdered Peanut Butter, Squeeze Pack   [] Smucker's Natural Peanut Butter - [place] or Creamy   [] Sunbutter Nut Butter   [] Wild Friends Protein Peanut Butter/[place] o Butter - Vanilla or Chocolate     JELLIES  o Polaner's All Fruit   o Clearly Organic Best Choice: Strawberry Fruit Spread       SNACKS    BARS  [] Kashi Bars - Chewy or Crunchy; Honey [place] o Flax or Peanut Butter   [] KIND Bars - 5 Grams of Sugar or Less   [] KIND Protein Bars - Strong and KIND   [] [facility] Protein Bar - All Varieties   [] Nature Valley Roasted Nut Crunch - [place] Crunch; Peanut Crunch   [] [facility] Simple Nut Bar - Roasted Peanut & Honey   [] [facility] Simple Nut Bar - [place], Cashew & Sea Salt   [] [facility] Nut [place] Bar - Salted Caramel Peanut   [] Think Thin Protein Bars   [] Quest Bars, Power Crunch Bars, Pure Protein Bars     BEEF JERKY - NITRATE FREE   [] Game On   [] Grass Run Farms   [] Krave   [] Ostrim   [] Perky Jerky   [] Primal Strips Meatless Vegan Jerky   [] [place]     CHIPS   [] Beanitos Chips   [] Fruit Crisps - e.g. Brother's-All-Natural, Bare Fruit, Yoga Chips   [] Almita's Soy Crisps: 1.3 ounce bag   [] Quest Protein Chips   [] Wasa Whole Wheat Crisp Bread     CRACKERS  [] Brielle's Gone Crackers   [] Nabisco Triscuit: Regular and Thin Crisp Crackers   [] Vans Say Cheese Crackers (G-F)     POPCORN/NUTS   [] Marcus Miller's Smart Pop Popcorn - Single Serving   [] 100-Calorie Pack of Nuts - All Varieties     PROTEIN POWDERS & DRINKS  []  Protein -  Whey Protein Powder   [] Garden of Life Raw Protein Powder   [] Iconic Ready-To-Drink Protein Drink   [] Fry One Protein Powder   [] VegaSport Protein Powder     SALSA/HUMMUS/DIPS   [] Eat Well Embrace Life: Zesty Sriracha Carrot o Hummus   [] Pre-Portioned Guacamole Packs    [] Jose's   [] Tostitos Restaurant Style Salsa       SOUPS   [] Vita's Organic Soups - Lentil, Vegetable, Split Pea, Low-Sodium     CANNED GOODS   [] 100% Pure Pumpkin   [] BlueRunner Creole Cream-Style Red Beans or Navy Beans   [] Cajun Power Chicken Gumbo Base   [] Chicken of the Sea Mosses [place]   [] Honey Fresh Cut Sliced Beets   [] Hormel Breast of Chicken in Water   [] LeSuer Tender Baby Whole Carrots   [] Leland Tabasco [place] Starter   [] StarKist: Chunk Lite Tuna in Water, Gourmet Select Pouches   [] StarKist: Yellow[place] Tuna Fillets   [] Trappey's: Kidney, Butter, [place], Black Eye, Field, and Black Beans   [] JAMES Aguayo's Turnip Greens or [place] Spinach     CONDIMENTS/ SAUCES/SPREADS/ SPICES  [] Andres Jaramillo's Magic Seasonings - Regular or Salt Free   [] Rosa Isela Thomas's Sauces - All Flavors   [] Laughing Cow Light - All Flavors   [] Dash Salt-Free Marinade - All Flavors   [] Robert & Corinna's: Heart Smart Pasta Sauces   [] Tabasco     SALAD DRESSINGS  [] Chasidy's Naturals: Lite Honey Mustard   [] Salvador's Own: Lighten Up Salad Dressing - All Varieties   [] OPA Greek Yogurt Dressings - Ranch, Blue o Cheese, Caesar, Feta Dill     SWEETENERS  [] Sweet [place] Sweetener   [] Swerve   [] Truvia     BEVERAGES  [] Coconut Water   [] Crystal Light PURE - All Flavors   [] Honest Tea: Just Green Tea, Unsweetened   [] Kombucha Tea   [] [place]   [] [place] Sisters Bloody Brielle Mix   [] Metromint - Zero-Sugar; All Natural Flavored   [] Cliff - Plain or Flavored   [] Deric Murray   [] Steaz - Zero-Sugar, All-Natural, Sparkling Tea   [] Tea Bags: Any Brand - e.g. Lucero, Yogi, Tazzo, Celestial   [] V8 100% Vegetable Juice   [] Vitamin Water Zero   [] Water   [] Zevia - Stevia Sweetened Soft Drink     BEER/TJ/LIQUORS  []Monroe's Premier Light 64 Calories   [] Bud Select - 55 Calories   [] [place] Sisters Bloody Brielle Mix   [] Greg Genuine Draft - 64 Calories   [] Red or White Wine - All Varieties     CEREALS:  HOT/COLD   [] Stephanie's Puffin's Original Cereal  [] [place] Oat Bran Hot Cereal - Cracked Wheat, Multi-Grain  [] Kashi GoLean Cereal  [] Kashi GoLean Hot Cereal packets - Vanilla; Honey Cinnamon  [] Shannon's Special K Protein Cereal  [] Fernandez's Steel Cut German Oatmeal  [] Nature's Path Smart Bran  [] Hindu Instant Oatmeal packet, Original  [] Hindu Old Fashioned Hindu Oats  [] Uncle Jessee's Whole Wheat & Flaxseed Original Cereal

## 2023-11-24 NOTE — PROGRESS NOTES
Subjective       Referring MD: sergio  PCP: [facility]  BMI noted  31  Diet: variable   Exercise/Activity: moderate  Sleep: good  Stressors: stable  Anxiety/Depression Screen/PHQ-2: stable    Labs reviewed  A1c, lipids within normal limits  hdl is < 50 (43)  Cmp gfr 59.3     Played tennis in college  Works for [employer]    Patient ID: Olga Alva is a 53 y.o. female.    + fam  hx of DM II    Chief Complaint: weight gain       Hx of hyperlipidemia; on lipitor. Tolerating.   She has had weight gain despite her attempts at lifestyle interventions. Rx mounjaro sent in.     Pt states does not feel as if she eats poorly. Maximized lifestyle changes.       Hx of DJD knees. Takes meloxicam. She is using mobic when is able. Does work out a fair amount. She is doing resistance training. She has a peloton, elliptical, weights.    No issues with back.     Previous medications: none   She has tried [facility], my fitness pal  Birth controL: s/p hyst    Food recall:  Bfast: coffee with honey and flavored creamer (alfred cream--sugar free)  No food  Rare breakfast  Lunch:  salad with protein  Dinner:  chicken, leftover, vegetable  Snack:apples, bananas, peanut butter  Water: adequate  Sugar Sweetened beverages: occasional sweet tea  SF lemonade  Etoh: rare--she plans to do none   Likes to cook   Stress at work can affect--has managed this   She, at times, does have tendency to get off track.    Started hormone cream through [facility] with her compounding pharmacy.       HPI       Objective     PAST MEDICAL HISTORY:  Past Medical History:   Diagnosis Date    Anxiety     Arthritis     Depression     Hypercholesteremia          PAST SURGICAL HISTORY:  Past Surgical History:   Procedure Laterality Date    ANTERIOR VAGINAL REPAIR  11/28/2016    CARPAL TUNNEL RELEASE Right 09/03/2020    w/ Trigger finger    COLONOSCOPY N/A 08/03/2018    Procedure: COLONOSCOPY;  Surgeon: Mauricio Brown III, MD;  Location: [facility];  Service: Endoscopy;  Laterality:  N/A;    FINGER CONTRACTURE RELEASE Right 08/10/2023    KNEE ARTHROSCOPY W/ ACL RECONSTRUCTION Left     KNEE ARTHROSCOPY W/ MENISCECTOMY Right 2015    LAVH with salpingectomy Bilateral 2016    dysmenorrhea    PUBOVAGINAL SLING  2016    TRIGGER FINGER RELEASE Right     WISDOM TOOTH EXTRACTION         FAMILY HISTORY:  Family History   Problem Relation Age of Onset    Breast cancer Maternal Grandmother     Diabetes Maternal Grandmother     Hypertension Father     Diabetes Mother     Diabetes Brother     Hypertension Brother     Diabetes Sister     Miscarriages / Stillbirths Sister     Cancer Neg Hx     Colon cancer Neg Hx     Eclampsia Neg Hx     Ovarian cancer Neg Hx      labor Neg Hx     Stroke Neg Hx           SOCIAL HISTORY:  Social History     Social History Narrative    No pets or smokers in household.       MEDICATIONS:  Medications have been reviewed.    ALLERGIES:  Allergies have been reviewed.    Vitals:    23 0947   BP: 124/88   Pulse: 83   Temp: 96.7 °F (35.9 °C)     Wt Readings from Last 10 Encounters:   23 80.4 kg (177 lb 4 oz)   23 80.4 kg (177 lb 4 oz)   23 78.9 kg (174 lb)   23 76.7 kg (169 lb 1.5 oz)   23 76.2 kg (168 lb)   10/21/22 76 kg (167 lb 8.8 oz)   22 73.5 kg (162 lb)   02/10/22 74.4 kg (164 lb)   21 77.2 kg (170 lb 3.1 oz)   21 74.4 kg (164 lb)       Lab Results   Component Value Date    WBC 6.72 2020    HGB 14.2 2020    HCT 44.4 2020     2020    CHOL 162 2023    TRIG 83 2023    HDL 43 2023    ALT 22 2023    AST 20 2023     2023    K 4.6 2023     2023    CREATININE 1.1 2023    BUN 12 2023    CO2 24 2023    TSH 1.440 2018    GLUF 89 10/15/2012    HGBA1C 5.1 2023       Review of Systems   Constitutional:  Negative for activity change, appetite change, fatigue and fever.   HENT:  Negative for mouth  dryness and goiter.    Eyes:  Negative for visual disturbance.   Respiratory:  Negative for apnea, cough, chest tightness and shortness of breath.    Cardiovascular:  Negative for chest pain, palpitations and leg swelling.   Gastrointestinal:  Negative for abdominal pain, constipation, diarrhea, nausea, vomiting and reflux.   Endocrine: Negative for cold intolerance, heat intolerance, polydipsia, polyphagia and polyuria.   Genitourinary:  Negative for frequency and menstrual problem.   Musculoskeletal:  Negative for arthralgias and myalgias.   Integumentary:  Negative for color change and rash.   Neurological:  Negative for dizziness, tremors, seizures, syncope, weakness and numbness.   Psychiatric/Behavioral:  Negative for decreased concentration, dysphoric mood, hallucinations, self-injury, sleep disturbance and suicidal ideas. The patient is not nervous/anxious and is not hyperactive.        Physical Exam  Vitals and nursing note reviewed.   Constitutional:       General: She is not in acute distress.  HENT:      Head: Normocephalic and atraumatic.      Mouth/Throat:      Pharynx: Oropharynx is clear.   Eyes:      General: No scleral icterus.     Pupils: Pupils are equal, round, and reactive to light.   Neck:      Comments: No TM  Cardiovascular:      Rate and Rhythm: Normal rate and regular rhythm.      Pulses: Normal pulses.      Heart sounds: Normal heart sounds. No murmur heard.     No friction rub. No gallop.   Pulmonary:      Effort: Pulmonary effort is normal. No respiratory distress.      Breath sounds: Normal breath sounds. No wheezing, rhonchi or rales.   Abdominal:      General: Bowel sounds are normal. There is no distension.      Palpations: Abdomen is soft.      Tenderness: There is no abdominal tenderness.   Musculoskeletal:         General: No swelling.      Cervical back: Normal range of motion and neck supple. No tenderness.   Lymphadenopathy:      Cervical: No cervical adenopathy.   Skin:      General: Skin is warm.      Capillary Refill: Capillary refill takes less than 2 seconds.      Findings: No erythema or rash.   Neurological:      Mental Status: She is alert and oriented to person, place, and time.   Psychiatric:         Mood and Affect: Mood normal.         Behavior: Behavior normal.              Assessment and Plan       ICD-10-CM ICD-9-CM   1. Dyslipidemia  E78.5 272.4   2. Family history of diabetes mellitus (DM)  Z83.3 V18.0   3. Class 1 obesity due to excess calories with serious comorbidity and body mass index (BMI) of 31.0 to 31.9 in adult  E66.09 278.00    Z68.31 V85.31   4. Primary osteoarthritis of both knees  M17.0 715.16      Dyslipidemia  -     Ambulatory Referral/Consult to Lifestyle Nutrition; Future; Expected date: 12/01/2023  -     Insulin, random; Future; Expected date: 11/24/2023    Family history of diabetes mellitus (DM)    Class 1 obesity due to excess calories with serious comorbidity and body mass index (BMI) of 31.0 to 31.9 in adult  -     Ambulatory Referral/Consult to Lifestyle Nutrition; Future; Expected date: 12/01/2023  -     Insulin, random; Future; Expected date: 11/24/2023  -     naltrexone (DEPADE) 50 mg tablet; Take 1/4 pill by mouth once daily for 2 weeks then 1/4 tablet by mouth twice daily  Dispense: 15 tablet; Refill: 0    Reviewed with pt risks/benefits/se's of naltrexone; she will send update  Would like to consider adding wellbutrin if tolerates naltrexone    Primary osteoarthritis of both knees  Chronic/stable    50 min spent with pt directly and additional 5 min chart review       Follow up in about 8 weeks (around 1/19/2024), or if symptoms worsen or fail to improve.

## 2023-12-21 ENCOUNTER — PATIENT MESSAGE (OUTPATIENT)
Dept: ADMINISTRATIVE | Facility: HOSPITAL | Age: 53
End: 2023-12-21
Payer: COMMERCIAL

## 2023-12-29 ENCOUNTER — NUTRITION (OUTPATIENT)
Dept: PRIMARY CARE CLINIC | Facility: CLINIC | Age: 53
End: 2023-12-29
Payer: COMMERCIAL

## 2023-12-29 VITALS — WEIGHT: 177.69 LBS | BODY MASS INDEX: 31.48 KG/M2

## 2023-12-29 DIAGNOSIS — E78.5 DYSLIPIDEMIA: ICD-10-CM

## 2023-12-29 DIAGNOSIS — E66.09 CLASS 1 OBESITY DUE TO EXCESS CALORIES WITH SERIOUS COMORBIDITY AND BODY MASS INDEX (BMI) OF 31.0 TO 31.9 IN ADULT: ICD-10-CM

## 2023-12-29 PROCEDURE — 99999 PR PBB SHADOW E&M-EST. PATIENT-LVL II: ICD-10-PCS | Mod: PBBFAC,,, | Performed by: DIETITIAN, REGISTERED

## 2023-12-29 PROCEDURE — 97802 PR MED NUTR THER, 1ST, INDIV, EA 15 MIN: ICD-10-PCS | Mod: S$GLB,,, | Performed by: DIETITIAN, REGISTERED

## 2023-12-29 PROCEDURE — 99999 PR PBB SHADOW E&M-EST. PATIENT-LVL II: CPT | Mod: PBBFAC,,, | Performed by: DIETITIAN, REGISTERED

## 2023-12-29 PROCEDURE — 97802 MEDICAL NUTRITION INDIV IN: CPT | Mod: S$GLB,,, | Performed by: DIETITIAN, REGISTERED

## 2023-12-29 NOTE — PATIENT INSTRUCTIONS
Your InBody results will be below your Nutrition Recommendations    NUTRITION RECOMMENDATIONS    Action Items:  Fuel every 3-4 hours.  Focus on fiber + protein at all meals.  Get at least 20-30 grams of protein at all meals.  Do not skip breakfast.  InBody Results from 11/24/23  Percent Body Fat: 38.4  Skeletal Muscle Mass: 60.4 lbs.  Basal Metabolic Rate: 1429  Visceral Fat Level: 14  Additional Recommendations  Limit added sugars to < 25g per day.  When looking at nutrition labels on protein sources look for: protein > fat  Hydration: At least ½ your body weight in ounces per day + an additional 20 ounces for each hour of activity or sweat loss.  Exercise kitty and website recommendations:  Street Parking - have an iPhone kitty or can use website, typically <30 minutes and higher intensity  [employer]  - kitty  Caliber Strong  - kitty traditional weightlifting programming  Moves  - kitty  [employer]  - kitty  Social Media I recommend following for tips and ideas:  Dietitian [place] Nutritionist  KRISTIAN Jimenez RD  Goal is at least 25 - 30 grams of fiber per day  High Fiber Foods  Raspberries, 1 cup, 8 grams  Pear, 1 medium, 5.5 grams  Apple with skin, 1 medium, 4.5  grams   Green peas, 1 cup, 9 grams   Broccoli, 1 cup chopped, 5 grams   [place] sprouts, 1 cup, 4 grams   Whole wheat pasta, 1 cup 6 grams   Quinoa cooked, 1 cup 5 grams   Popcorn, plain 3 cups, 3.5 grams   Lentils, 1 cup. 15.5 grams   Kvng seeds, 1 tablespoons ,3 grams   Almonds, 1 ounce, 3.5 grams   Black beans, 1/2 cup, 7.5 grams   Gayle Alfredo Delightful bread, 2 slices, 5 grams   Carb balance tortillas, 1 soft taco sized, 15 grams   30 grams of protein cheat sheet:  3.5 ounces cooked chicken breast  4 ounces ground turkey breast  5 ounces [place] strip steak  5 ounces shrimp  5 ounces cod  6 ounces tuna fish  5 ounces salmon  12 ounces firm tofu  1.5 scoops protein powder  1.5 cups deshelled edamame  2 cups black beans  5  eggs  Macronutrient Totals  Calories Protein Carb Fat   1600 to 1700 Grams Grams Grams    120 to 130 150 to 170 50 to 55     Breakfast Options  300-500 calories, at least 20g protein    Make sure you are accounting for your coffee in the morning as you add in breakfast. This is what I'm estimating your coffee to be based on our conversation. This should fit in the total calories for the breakfast.   12 ounces coffee + 2 tbsp sugar free creamer + 1 tbsp honey + 1 tsp nestle chocolate  110 calories, 1g protein, 24g carbs, 2g fat    Idea #1:  408 calories, 32g protein, 34g carbs, 16g fat  ½ cup egg whites/beaters scrambled  2 Veggies Made Great cinnamon roll muffins  3 turkey or chicken breakfast sausage links  1 cup fresh strawberries  Idea #2:   362 calories, 30g protein, 46g carbs, 7g fat  Yogurt Parfait  1 cup Greek yogurt  ½ cup blueberries  ½ cup Kashi Go! Cereal or other low sugar cereal (see recommendations below)  1 tbsp kelvin seeds  Idea #3:   420 calories, 20g protein, 25g carbs, 28g fat  2 slices Gayle Alfredo Delightful bread  1 egg fried in olive or avocado oil spray  1 slice center cut ambrose  ½ small avocado  1 slice pepperjack cheese  Idea #4:  High Protein Oatmeal  High Protein Overnight Oats    Nutrition for plain recipe per servin calories, 38g protein, 40g carbohydrate, 9g fat  Customizable High Protein Baked Oatmeal  Nutrition for plain recipe per servin kcal, 20g protein, 38g carbs, 9g fat   Idea #5:  300 calories, 28g protein, 21g carbohydrate, 17g fat  Meal Prep Breakfast Burritos  1 [place] Carb Balance Tortillas (flour or whole wheat)  1 egg scrambled  2 ounce turkey or chicken breakfast sausage (try this Homemade version)    2 tbsp reduced-fat shredded cheese  Optional add-ins: spinach, bell peppers, onion, mushrooms  Idea #6:  345 calories, 42g protein, 24g carbohydrate, 9g fat  Basic Protein Smoothie  1 scoop protein powder  1 cup frozen fruit  ½ cup frozen cauliflower rice  8 ounces  Fairlife 2% milk or unsweetened soy milk  1 tablespoon kelvin seeds  Optional add-ins: peanut butter or PB2, ground flax seed, coconut flakes, sugar-free pudding mix, handful baby spinach, ½ cup pickled beets    Idea #7:  386 calories, 23g protein, 35g carbohydrate, 16g fat  Breakfast [place]  Whole wheat English muffin  1 egg  2 slices center cut ambrose or ham or turkey deli meat  1 slice [place] Ultra-Thin Sliced Farhad Stuart  1 cup cantaloupe    Idea #8:  Without toppings: 245 calories, 19g protein, 13g carbs, 14g fat  Protein Kelvin Seed Pudding  Top with berries and/or up to 2 tbsp chopped nuts     Idea #9:  Casserole only: 260 calories, 17g protein, 15g carbs, 14g fat  Mexican Breakfast Casserole  Make higher protein by adding in 1 cup egg whites or reduced-fat cheese  Serve with avocado -OR- a whole wheat English muffin/1 slice whole grain bread -OR- 1 piece/1 cup fruit    Idea #10:  475 calories, 28g protein, 34g carbs, 26g fat  Omelet:  1 egg + ¼ cup egg whites  Unlimited non-starchy vegetables: spinach, bell peppers, onions, mushrooms, etc.  2 slices [place]ormel [place] Cut Ambrose   2 tbsp reduced-fat shredded cheese  2/3 cup Simply Potatoes Shredded Hashbrowns made in the air fryer  1 tbsp ketchup   1 cup chopped cantaloupe    Lunch Ideas  400 - 500 calories  At least 30g protein    Idea #1:  Varies  Building A Better-For-You Salad   Start with a base of fresh greens: arugula, spinach, butter lettuce, mixed greens, kale, ceasar  Add at least 2-3 veggies  Raw veggies add a nice crunch  Grilled or roasted veggies add a nice charred, caramelized flavor  Pickled veggies are great for adding a hint of sweet/sour flavor, while fermented veggies give you added probiotic benefits  Add a lean protein (3-4 ounces)  Grilled chicken, turkey, tuna/salmon packet, shrimp, tofu, steak  Add high fiber carbs (~ ½ cup)  Beans & quinoa   Grains: whole grain, chickpea pasta, farro, bulgar  Starchy veggies: potatoes, winter squash,  corn  Fruit: berries, grapes, chopped apples, etc.  Add texture + flavor: choose 1-2, up to ~ 2 tablespoons of each item  Avocado  Cheese  Hummus   Nuts   seeds  Dress it up  Choose light/low-fat options: Lor[place] Own Light dressings and ALTHIA Farms are great options  Make your own: oil + vinegar + seasonings + a little bit of Tony mustard  Homemade Salad Dressing Recipes  Try a salad kit (<400 calories including dressing) and top with a can or packet tuna/salmon or 3-4 ounces rotisserie chicken    Idea #2:  [place] only: 388 calories, 37g protein, 24g carbohydrate, 16g fat  Build a better sandwich:   2 slices Gayle Alfredo Delightful Wheat Bread (or other whole grain, thin sliced bread)   3-4 ounces deli meat  1 sliced reduced-fat cheese or ¼ sliced avocado  unlimited non-starchy vegetables such as lettuce, tomato, onion, pickle, etc.  1 tablespoons sauce (doyle, Ranch, honey mustard, BBQ sauce, hummus), unlimited yellow or brown mustard  Side items (choose one):   1 cup of raw non-starchy vegetables (baby carrots, sugar snap peas, cucumbers, etc.)   ½ cup cooked non-starchy vegetables  1 cup fruit or 1 small piece  1 bag of baked chips (<120 calories for portion/bag)  Idea #3:  Snack Box Lunch  Option 1:  382 calories, 34g protein, 30g carbohydrate, 14g fat  2 boiled eggs  2 ounces deli meat  ½ bell pepper  11 mini pretzels  ½ cup grapes  Light string cheese  Option 2:   386 calories, 27g protein, 47g carbohydrate, 10g fat  1 serving Nut Thins  Light string cheese  Tuna pack, any flavor  1 cup chopped strawberries  Fiber one brownie bar  ½ cup blueberries  Option 3:  403 calories, 32g protein, 44g carbohydrate, 11g fat  1 ounce pretzel crisps  ½ cup apple sliced  2 slices ultra-thin provolone cheese  2 ounces rotisserie chicken  4 tbsp powdered peanut butter mixed with water (see directions)  ½ cup blackberries  Option 4:   446 calories, 36g protein, 26g carbohydrate, 22g fat  2 tbsp hummus  3 ounces baby  carrots  2 hard boiled eggs  ½ cup grapes  2 ounces deli turkey wrapped around 2 low fat string cheese  Idea #4  400 calories, 27g protein, 54g carbs, 9g fat  Tuna Salad  1 5-ounce can chunk light tuna in water  2 tbsp. Fage 0% Greek Yogurt  Chopped celery & onion  Salt, pepper, lemon juice to taste  Serve with 11 Triscuit Thin Crisps - any flavor variety   1 medium apple + 2 tbsp brownie batter hummus    Idea #5:  433 calories, 37g protein, 41g carbs, 19g fat  Chicken Caesar Wrap  3 ounces rotisserie chicken  2 tbsp [place] Creamy Caesar dressing  2 tbsp parmesan cheese  1 cup chopped ceasar  1-2  [place] Carb Balance Sundried Tomato Basil tortillas  1 serving Popcorners Chips    Dinner Ideas  400 - 500 calories  At least 30g protein    Idea #1:  Varies: ~ 400-500 calories, 31-49g protein, 35g carbohydrate, 10g fat  3-5 ounces of baked or broiled fish, seafood, or lean meat cooked with spray olive oil  1 cup or more of cooked non-starchy vegetables  ½ - 2/3 cup carbohydrate of choice: rice, pasta, peas, corn, potatoes, butternut squash  Idea #2:  Varies: made with 1 cup quinoa, 4 ounce sirloin steak, 1 cup chopped broccoli, and 1 tablespoon Lemon Basil Vinaigrette 500 calories, 46g protein, 42g carbohydrate, 16g fat  Build a grain/power bowl:   Choose a starch (1/2- 1 cup feel free to mix/match)  Brown rice, farro, quinoa, millet, barley, couscous  White or sweet potatoes  [place] squash  Choose a protein (3-4 ounces)  [place], steak (lean cut), chicken, shrimp, edamame, tofu  Vegetables (unlimited amounts)  Try them roasted, steamed, or grilled for variety  Eggplant, broccoli, carrots, bell peppers, green beans, radishes, cucumbers, parsnips, lettuce, cabbage  Dressing/sauce  Vinaigrette, yogurt, warm broth, hot sauce, soy sauce, chimichurri, pesto, hummus, or any combinations!  Garnish (optional)  Fresh herbs, micro greens, nuts, seeds, toasted coconut  Note that nuts/seeds/coconut contain a lot of calories and  should be used sparingly  Idea #3:  433 calories, 42g protein, 40g carbohydrate, 25g fat  [place]  1-2 carb balance tortillas  ¼ cup cheese of choice  2 tbsp sour cream  3-4 ounces lean (93/7) ground beef or turkey or rotisserie chicken  Idea #4:  412 calories, 35g protein, 41g carbohydrate, 12g fat  Better-for-you pizza  2-Ingredient High Protein Dough, (or use a whole wheat chon) + ¼ cup red sauce + 1/3 cup cheese + 2-4 ounces of protein (rotisserie chicken, turkey pepperoni, sliced grilled chicken/steak) + veggies of choice  Serve with at least 1 cup of non-starchy vegetables or side salad  Idea #5:  Varies, made with chicken sausage and marinara and steamed broccoli  433 calories, 35g protein, 44g carbohydrate, 13g fat  Better-for-you pasta  1 cup cooked Banza/Whole Wheat pasta + 2-4 ounces lean protein + ¼ cup mari sauce or ½ cup red sauce or 1-2 tablespoons pesto   Serve with 1-2 cups non-starchy vegetables  Idea #6:  450 calories, 36g protein, 54g carbs, 12g fat  2 tbsp. GBertha Toro Sugar Free Stir [place] Sauce  ½ cup cauliflower rice mixed with ½ cup white rice  3 ounce rotisserie chicken  1 cup chopped broccoli  2 Cream Cheese Rangoons (no sauce)    Snack Ideas  Around 200 calories, needed if going >4 hours without fueling  Choose one of the following:  ½ cup Good Culture Low-Fat cottage cheese + ¼ cup freeze dried blueberries  1 container (5.3 ounces) low-sugar Greek yogurt + ¼ cup low-sugar cereal  2 light mozzarella cheese sticks + 1 individual bag of Pop Chips  ¼ cup or individual portion bag of nuts + 1 serving Bare Apple Chips   5 whole grain crackers + 2 Light Babybel Cheese + 1 small bell pepper  1 medium apple + 1 tbsp peanut butter  Turkey roll up: 1 Carb Balance tortilla + 2-3 slices turkey deli meat + 1 tbsp Olive oil doyle  Snack Pack Edamame   1 cup grapes + 2 light mozzarella cheese sticks  1-2 Chomps sticks + 1 piece of fruit   Protein bar:  <150 calories and >10g protein  Protein Shake:  At  least 20g protein  <170 calories  2 slices deli meat wrapped around 2 low-fat cheese sticks + 1 cup chopped fruit    Sweet Treats  Okay to build in 3-4x per week  Protein shake  ½ cup berries + a dollop of whipped cream  Outshine Frozen Fruit Bars (2-3 if wanted)  Chocolate Dipped Banana Bites  Raspberry Lemon Greek Frozen Yogurt Bark  1 cup strawberries with 2-3 tbsp chocolate hummus  Sugar Free Pudding or Jello  5.3 ounce 0% Fage yogurt mixed with 1-2 tablespoons sugar-free pudding mix serve with berries  All the Best Banana Nice Cream Recipes   Any sweet treat under 200 calories (think mini/fun-sized)    Favorite Websites for Recipe Inspiration  [place]  Real Food Dietitians   Budget Bytes  Eating Bird Food  Fit Foodie Finds  Oh Snap Macros   35 [place] Friendly Meal Prep Recipes   9 Meal Prep Success Tips       Additional Recipe Ideas:  Breakfast  Oats Overnight + 6 ounces Fairlife 2% milk   Breakfast Toast Ideas + Protein Shake  Meal Prep Breakfast Options:  [place] Chicken Egg Cups  Ham & Broccoli Breakfast Casserole  Bagel Ham and Cheese Quiche  Blueberry Protein Pancakes  English Muffin Breakfast Bake     Raspberry Fiber Smoothie (makes 1 smoothie)  1 cup frozen raspberries  1 cup unsweetened almond milk  1 scoop protein powder  ½ cup frozen cauliflower (florets or riced)  1 tbsp almond butter  1 tbsp kelvin seeds  PB&J Smoothie Bowl  ¼ cup milk of your choice  2/3 cup frozen blueberries  2/3 cup sliced strawberries, frozen  1 scoop vanilla protein powder  1 tbsp peanut butter  Optional toppings: 1 tbsp melted peanut butter, 1-2 tbsp granola, kelvin seeds, blue berries  Spinach Avocado Smoothie  1 cup nonfat plain Greek yogurt  1 cup fresh spinach  1 frozen banana  ¼ avocado  2 tbsp water  1 tsp honey  Lunch/Dinner  Chicken Enchiladas - great for freezing serving with a side salad or at least 1 cup non-starchy vegetables  Ground Turkey Skillet with Zucchini, Corn, Black Beans, and Tomato great by itself or  serve over 1 cup cauliflower rice or 1/3 cup brown rice    Air Fryer Chicken Thighs with Air Fryer Frozen Broccoli & 1 cup cubed roasted potatoes  BBQ Grilled chicken breast with Brown Sugar Baked Beans + 1 cup non-starchy vegetables   Juicy Chicken Breast 6 Ways  30 Sheet Pan Dinners    Chicken Fajita Kabobs serve with brown rice, quinoa, or carb smart tortillas + additional non-starchy vegetables  Pasta Primavera add in additional protein such as shrimp, sliced steak, ground beef, or rotisserie chicken    Sheet Pan Turkey Meatloaf + Broccoli serve with 1 small baked potato and ¼ cup reduced fat cheese  Ranch Chicken Meal Prep  Calorie Swaps  Sweet Baby Rays BBQ sauce --> Sweet Baby Rays Zero Sugar BBQ Sauce  Greek Gods Honey Vanilla Greek Yogurt --> [place] Triple Zero Vanilla Greek Yogurt  Ice cream [place] --> Skinny Cow No Sugar Added Ice Cream Bar  Rice --> 50/50 mixture of rice & cauliflower rice  Cream Cheese --> Whipped Cream Cheese  Sliced cheese --> ultra-thin sliced cheese  Evans/Sour Cream --> Fage 0% Greek Yogurt  Syrup --> Lite version  Strawberry jam --> sugar free preserves  Tortillas --> corn tortillas or carb balance tortillas  Ran --> [place] Ranch  Vinaigrettes --> Edmundo's Red Wine Vinaigrette Dressing or Salvador's Own Light Dressings  Coffee syrups --> Sugar free options  Pasta --> Spaghetti Squash  Evans --> avocado/olive oil evans  Avocado --> portion control smashed avocado cups  Restaurant Tips   Pick 1 out of the 4 Rule: Instead of eating bread/tortilla chips, an appetizer, alcoholic drink and dessert, choose just one to have with your entrée   Focus on lean proteins: Refer to lean meat/meat substitutes page in meal planning guidebook. Select items grilled, baked, broiled, braised, poached or roasted   For your heart health, avoid crispy, crunchy, breaded, paneed or stuffed items and items that are cream based, au gratin or buttered   Order sauces, dressings, and gravies on the  side. This way you can add 1-2 tablespoons yourself. This helps with portion control    Request extra non-starchy vegetables instead of a starchy side dish. If the starchy side is something you love, consider splitting it with someone else at the table   Beverages: Order water with lemon, sparkling water, or unsweetened tea. Avoid sugary soft drinks, juices and mixers   Deep fried foods: Enjoy no more than 2x/month    Favorite Brands  Protein Bars/Shakes/Powders  Bars  RX Bars  Fit Crunch  THINK  Quest  One  Shakes  Core Power Shakes (26g or 42g protein)  Fairlife Nutrition Plan (30g protein)   Ensure Max Protein (30g protein)  Premier Protein Shakes (30g protein)  Boost High Protein (20g protein)   Muscle Milk Genuine Protein Shakes (25g protein)   Quest Protein Shakes (30g protein)   Orgain Protein Shakes (20-26g protein)   Powders  Ghost  Optimum Nutrition   Muscle Milk  Garden of Life  Fry   Naked   Pasta/Rice  Banza Chickpea Pasta  Barilla Whole Wheat  Paris Rice  Success Boil in Bag Brown Rice  Bread  Jason's Killer Bread thin sliced  Nature's Own 100% Whole Grain Sugar-Free  [place] Whole Wheat Small Slice  Gayle Alfredo Delightful White Whole Wheat  [place] Carb Balance Tortillas  Ole Extreme Wellness Tortillas  Flatout Wraps  [place] Carb Counter Tortillas  Dairy  Chobani less sugar  Dannon Light and Fit Yogurt  Fage 2% plain  [place] Triple Zero  Fairlife 2% Milk  Ripple Milk, unsweetened (milk alternative)  Soy Milk, Vanilla Unsweetened  Good Culture Low-Fat Cottage Cheese  Dressings/Sauces  [facility] Greek Yogurt Ranch   Sweet Baby Ray's BBQ Sauce - no added sugar  Any ketchup with no added/less added sugar  Hunjason or Shay or Primal Kitchen  G [place] Sugar Free Sauces   Salvador's Own Dressings   Edmundo's Dressings   Cereal  Kashi GO!  Ratio  Shredded Wheat  Fiber One  Grape Nuts  Special K Protein  Snacks  Nature's Garden Trail Mix Snack Packs  Pop Chips (like healthier Lays)  Pop Corners (like healthier  Doritos)  Skinny Pop Popcorn  Lesser Evil Popcorn  Beanitos Chips  Zoroastrianism Oats Rice Crisps   Late July Kvng & Quinoa Tortilla Chips  Great Value Zero Sugar Beef Jerky (be mindful of sodium)  Chomps Meat Sticks        [facility] Eat Fit    Designed to take the guesswork out of dining out healthfully, [place] [facility] Fit makes the healthy choice the easy choice   Visit [email or web address]    Download the [facility] Eat Fit kitty for free on your smartphone   Lists of all Eat Fit restaurant partners & dishes by location   Nutrition facts included for all Eat Fit dishes   200+ Eat Fit approved recipes   Eat Fit shopping guide + community wellness resources   Eat Fit Cookbook   Craft: The Eat Fit Guide to Zero Proof Cocktails     K.I.S.S. (Keep It Simple Silly)  Pick 2-3 non-starchy vegetables  Rotate during the week having them for lunches and dinners.  Pick 1-2 different types of meats  Use different sauces   BBQ  Spruce  Teriyaki  Pesto  Salsa  Seasonings  Slap Ya Mama  Lemon Pepper  Everything but the [employer]'s Chipotle Cherry  Pick 2 different starches to have during the week  For example:  Potatoes  sweet potatoes  Corn  butternut squash  Banza chickpea pasta  brown rice  Keep breakfast simple, have two options and stick with those two options the whole week  For example, making Overnight Oats with a serving of fruit for 3 breakfasts and eat 2 Healthy Egg Muffin Cups with a slice of whole grain toast and 1 serving fruit for the other 4 breakfasts    For lunch/dinner you could do something simple like the following: This would allow you to not waste a lot of ingredients but have a wide variety of flavors that you could mix match all week.  Chicken thighs 3 ways:   BBQ Sauce  Lemon Pepper Seasoning  Ranch Seasoning  Potatoes 2 ways:   cubed and roasted  mashed with low-fat milk, Greek yogurt (instead of butter or sour cream) and low-fat cheese  Brown rice - made in chicken stock, low sodium (gives it extra  flavor)  Frozen Broccoli with ranch seasoning, microwaved  Frozen broccoli roasted, sprinkled with lemon juice and 1 tbsp parmesan  Green beans with sliced mushrooms  Green beans sauteed with garlic  15-Minute Meals  Two slices whole wheat bread + frozen turkey burger + microwave broccoli  Chickpea pasta + jar tomato sauce + chicken sausage + side salad kit  Microwave rice + rotisserie chicken + frozen veggie mix + teriyaki sauce + chopped peanuts  Quentin grilled chicken rotisserie or blackened grilled chicken tenders + frozen garlic bread + baby carrots + hummus  Stir Montenegro Chicken - make with Banza pasta + add in double vegetables   Shrimp + BBQ sauce + canned corn + canned beans + microwave vegetables  Can tuna/salmon/chicken + 2 tablespoons Greek yogurt/avocado oil doyle + 1 serving Nut Thins + apple + cheese stick   Calorie Tracking Tips  Tracking calories can be a great and helpful tool. Here are some tips to help you do it accurately:  Use a food scale  Measuring food with a scale provides the most accurate representation of portion sizes. This is particularly important for items like meats, grains, and other dense foods. It removes estimation errors.  Read nutrition labels  Familiarize yourself with nutrition labels on packaged foods. Pay attention to serving sizes and check the calories, macronutrients, and other relevant information. Be aware of hidden sources of calories such as added sugars or high-fat content.  Choose whole foods when possible  Whole, minimally processed foods are generally easier to track accurately than complex, multi-ingredient dishes. For instance, chicken breast is easier to track than a casserole.  Use a reliable tracking kitty  Cronometer, Lose It!, My Fitness Pal  Be precise with measurement  Be specific with measurements. Don't write down 1 slice of bread, but specify the type (whole wheat, white, etc.) and if possible, the weight in grams or ounces.  Account for cooking  methods  Cooking can change the weight and nutritional content of food. For example, 4 ounces of raw chicken breast will weigh less after cooking due to moisture loss. Consider this when tracking.  Account for oils, not tracking how much oil we use in a pan can add extra calories quickly. Recommend using spray oil to minimize added calories to dishes.

## 2023-12-29 NOTE — PROGRESS NOTES
"Nutrition Assessment    Visit Type: Insurance initial  Session Time:  2 Hours  Reason for MNT visit: Pt in for education and nutrition counseling regarding Obesity.     Age: 53 y.o.  Wt:   Wt Readings from Last 10 Encounters:   12/29/23 80.6 kg (177 lb 11.1 oz)   11/24/23 80.4 kg (177 lb 4 oz)   11/21/23 80.4 kg (177 lb 4 oz)   08/31/23 78.9 kg (174 lb)   02/16/23 76.7 kg (169 lb 1.5 oz)   02/14/23 76.2 kg (168 lb)   10/21/22 76 kg (167 lb 8.8 oz)   02/17/22 73.5 kg (162 lb)   02/10/22 74.4 kg (164 lb)   12/09/21 77.2 kg (170 lb 3.1 oz)     Ht:   Ht Readings from Last 1 Encounters:   11/24/23 5' 3" (1.6 m)     BMI:   BMI Readings from Last 5 Encounters:   11/24/23 31.40 kg/m²   11/21/23 31.40 kg/m²   08/31/23 30.82 kg/m²   02/16/23 29.95 kg/m²   02/14/23 29.76 kg/m²       Client states:   12/29/23 Inbody done 11.24.23 SMM: 60.4 PBF: 38.4 Visceral Fat: 14 BMR: 1429  WW got tired of keying in things every day. Logs in MFP sometimes, aims for 1503-6137 kcal per day. Aims for a split of 40% carbohydrates, 30% protein, 30% fat. Struggles reaching protein goals (90 grams). Is a fairly active person often gets 10,000 + steps per day.   Discussed:  Role of protein + fiber at all meals  How to get in at least 25-30 grams of fiber per day  How to get in at least 100 grams of protein per day  Increasing kcal to 1600 - 1700   Healthy eating versus heathy eating for weight loss  Adequate hydration  InBody results    Medical History  Problem List             Resolved    Gynaecologic exam normal         Osteoarthritis of both knees         Dyslipidemia         Class 1 obesity due to excess calories with serious comorbidity and body mass index (BMI) of 31.0 to 31.9 in adult            Past Medical History:   Diagnosis Date    Anxiety     Arthritis     Depression     Hypercholesteremia        Past Surgical History:   Procedure Laterality Date    ANTERIOR VAGINAL REPAIR  11/28/2016    CARPAL TUNNEL RELEASE Right 09/03/2020    w/ " Trigger finger    COLONOSCOPY N/A 08/03/2018    Procedure: COLONOSCOPY;  Surgeon: Mauricio Brown III, MD;  Location: [facility];  Service: Endoscopy;  Laterality: N/A;    FINGER CONTRACTURE RELEASE Right 08/10/2023    KNEE ARTHROSCOPY W/ ACL RECONSTRUCTION Left 2013    KNEE ARTHROSCOPY W/ MENISCECTOMY Right 2015    LAVH with salpingectomy Bilateral 11/28/2016    dysmenorrhea    PUBOVAGINAL SLING  11/28/2016    TRIGGER FINGER RELEASE Right     WISDOM TOOTH EXTRACTION          Medications    Prior to Admission medications    Medication Sig Start Date End Date Taking? Authorizing Provider   atorvastatin (LIPITOR) 10 MG tablet Take 1 tablet (10 mg total) by mouth once daily. 10/17/23   Antonio Olivera MD   coQ10, liposomal ubiquinol, 100 mg/5 mL Susp  8/1/23   Provider, Historical   glucosamine/chondr briscoe A sod (OSTEO BI-FLEX ORAL) Take by mouth.    Provider, Historical   meloxicam (MOBIC) 15 MG tablet  12/5/22   Provider, Historical   naltrexone (DEPADE) 50 mg tablet Take 1/4 pill by mouth once daily for 2 weeks then 1/4 tablet by mouth twice daily 11/24/23   Neris Day MD   progesterone (PROMETRIUM) 100 MG capsule Take 100 mg by mouth once daily. 8/30/23   Provider, Historical   tirzepatide (MOUNJARO) 2.5 mg/0.5 mL PnIj Inject 2.5 mg into the skin every 7 days. 11/22/23   Antonio Olivera MD   turmeric, bulk, 95 % Powd  9/1/23   Provider, Historical        Vitamins, Minerals, and/or Supplements:  CoQ10, Osteo Bi-flex, Tumeric without food    Social History    Marital status:      Social History     Tobacco Use    Smoking status: Never    Smokeless tobacco: Never   Substance Use Topics    Alcohol use: No       Labs   Reviewed and noted    LIFESTYLE FACTORS    Sleep: good  Hours: 7-8  Wake: 530 am   Sleep: 9-10 pm    Stress Level: low  Stress management: not discussed    Support System:  spouse    Physical Activity: Moderately active (moderate intensity, 3-5 days a week)  Types of activity:  has home gym // strength training // pilates // cardio  Frequency of activity: 45 minutes of cardio + pilates 2-3 days    Dining out: very rare    Frequency of consumption of fried foods: rare    Meal preparation/shopping: self    Food Allergies or Intolerances:  NKFAI      Beverages  Water: water with propel // water when working out  Alcohol: very rare   Coffee: 1 cup daily sugar free creamer + 1 tbsp honey + 1 tsp nestle chocolate  Energy Drinks: none  Tea: sometimes sweet tea  Soda: 1 can no-sugar ginger ale   Milk: none  Juice: none  Other: crystal light    24-hour Recall  Breakfast: skips // coffee in morning  AM Snack: 10-11 am fruit  Lunch: 1130/12 1/2 salad kit with protein   PM Snack skips  Dinner: chicken meatballs ([employer]) + hash browns //   Desserts/Snacks: yogurt with berries if hungry    Diagnosis    Inadequate protein intake related to skipping meals and snacks as evidenced by 24-hour food recall.    Intervention    Estimated Energy Requirements:   Calories: 1700  Protein: 130 grams   Carbohydrates: 170 grams   Fat: 55 grams   Baseline for fluids: 90 ounces + sweat loss    Written Materials Provided  Healthy Eating Plate  RD contact information    Goals:  Fuel every 3-4 hours  Focus on fiber + protein at all meals  Get at least 20-30 grams of protein at all meals  Do not skip breakfast    Monitoring/Evaluation    Monitor the following:  Weight  Sleep  Stress Management  Movement    Communicated with healthcare provider and documented plan for referral to appropriate agency/healthcare provider as needed    Supervising Physician: Neris Day MD    Patient motivation, anticipated barriers, expected compliance: Patient is motivated and has verbalized understanding and intent to comply.     Comprehension: good     Follow-up: PRN

## 2024-01-01 ENCOUNTER — PATIENT MESSAGE (OUTPATIENT)
Dept: PRIMARY CARE CLINIC | Facility: CLINIC | Age: 54
End: 2024-01-01
Payer: COMMERCIAL

## 2024-01-04 ENCOUNTER — OFFICE VISIT (OUTPATIENT)
Dept: PRIMARY CARE CLINIC | Facility: CLINIC | Age: 54
End: 2024-01-04
Payer: COMMERCIAL

## 2024-01-04 DIAGNOSIS — M17.0 PRIMARY OSTEOARTHRITIS OF BOTH KNEES: ICD-10-CM

## 2024-01-04 DIAGNOSIS — R63.5 WEIGHT GAIN: Primary | ICD-10-CM

## 2024-01-04 DIAGNOSIS — Z83.3 FAMILY HISTORY OF DIABETES MELLITUS (DM): ICD-10-CM

## 2024-01-04 DIAGNOSIS — E66.09 CLASS 1 OBESITY DUE TO EXCESS CALORIES WITH SERIOUS COMORBIDITY AND BODY MASS INDEX (BMI) OF 31.0 TO 31.9 IN ADULT: ICD-10-CM

## 2024-01-04 PROCEDURE — 99214 OFFICE O/P EST MOD 30 MIN: CPT | Mod: 95,,, | Performed by: FAMILY MEDICINE

## 2024-01-04 PROCEDURE — 1160F RVW MEDS BY RX/DR IN RCRD: CPT | Mod: CPTII,95,, | Performed by: FAMILY MEDICINE

## 2024-01-04 PROCEDURE — 1159F MED LIST DOCD IN RCRD: CPT | Mod: CPTII,95,, | Performed by: FAMILY MEDICINE

## 2024-01-04 RX ORDER — NALTREXONE HYDROCHLORIDE 50 MG/1
TABLET, FILM COATED ORAL
Qty: 15 TABLET | Refills: 1 | Status: SHIPPED | OUTPATIENT
Start: 2024-01-04 | End: 2024-01-29

## 2024-01-04 RX ORDER — BUPROPION HYDROCHLORIDE 100 MG/1
TABLET, EXTENDED RELEASE ORAL
Qty: 60 TABLET | Refills: 1 | Status: SHIPPED | OUTPATIENT
Start: 2024-01-04 | End: 2024-01-29 | Stop reason: ALTCHOICE

## 2024-01-04 NOTE — PATIENT INSTRUCTIONS
"      Access [employer] for additional drug information, tools, and databases.  Copyright 1679-6284 [employer] All rights reserved.  Contributor Disclosures  (For additional information see "Bupropion: Drug information" and see "Bupropion: Pediatric drug information")    You must carefully read the "Consumer Information Use and Disclaimer" below in order to understand and correctly use this information.  Brand Names: [place]  Aplenzin;     Forfivo XL;     Wellbutrin SR;     Wellbutrin XL    Brand Names: Anna  MYLAN-BuPROPion XL [DSC];     ODAN Bupropion SR;     TARO-Bupropion XL;     TEVA-Bupropion XL;     Wellbutrin SR;     Wellbutrin XL;     Zyban    Warning  Drugs like this one have raised the chance of suicidal thoughts or actions in children and young adults. The risk may be greater in people who have had these thoughts or actions in the past. All people who take this drug need to be watched closely. Call the doctor right away if signs like depression, nervousness, restlessness, grouchiness, panic attacks, or changes in mood or actions are new or worse. Call the doctor right away if any thoughts or actions of suicide occur.    What is this drug used for?  It is used to treat depression.  It is used to prevent seasonal affective disorder (SAD).  It is used to help you stop smoking.  It may be given to you for other reasons. Talk with the doctor.    What do I need to tell my doctor BEFORE I take this drug?  If you are allergic to this drug; any part of this drug; or any other drugs, foods, or substances. Tell your doctor about the allergy and what signs you had.  If you have ever had seizures.  If you drink a lot of alcohol and you stop drinking all of a sudden.  If you use certain other drugs like drugs for seizures or anxiety and you stop using them all of a sudden.  If you have ever had an eating problem like anorexia or bulimia.  If you have any of these health problems: Kidney disease or liver " disease.  If you have taken certain drugs for depression or Parkinson's disease in the last 14 days. This includes isocarboxazid, phenelzine, tranylcypromine, selegiline, or rasagiline. Very high blood pressure may happen.  If you are taking any of these drugs: Linezolid or methylene blue.  If you are taking another drug that has the same drug in it.  This is not a list of all drugs or health problems that interact with this drug.  Tell your doctor and pharmacist about all of your drugs (prescription or OTC, natural products, vitamins) and health problems. You must check to make sure that it is safe for you to take this drug with all of your drugs and health problems. Do not start, stop, or change the dose of any drug without checking with your doctor.    What are some things I need to know or do while I take this drug?  For all patients taking this drug:  Tell all of your health care providers that you take this drug. This includes your doctors, nurses, pharmacists, and dentists.  Avoid driving and doing other tasks or actions that call for you to be alert or have clear eyesight until you see how this drug affects you.  This drug may affect certain lab tests. Tell all of your health care providers and lab workers that you take this drug.  Do not stop taking this drug all of a sudden without calling your doctor. You may have a greater risk of side effects. If you need to stop this drug, you will want to slowly stop it as ordered by your doctor.  High blood pressure has happened with this drug. Have your blood pressure checked as you have been told by your doctor.  This drug may raise the chance of seizures. The risk may be higher in people who take higher doses, have certain health problems, or take certain other drugs. People who suddenly stop drinking a lot of alcohol or suddenly stop taking certain drugs (like drugs used for anxiety, sleep, or seizures) may also have a higher risk. Talk to your doctor to see if  you have a greater chance of seizures.  Avoid drinking alcohol while taking this drug.  Talk with your doctor before you use marijuana, other forms of cannabis, or prescription or OTC drugs that may slow your actions.  It may take several weeks to see the full effects.  This drug is not approved for use in children. Talk with the doctor.  If you are 65 or older, use this drug with care. You could have more side effects.  Tell your doctor if you are pregnant, plan on getting pregnant, or are breast-feeding. You will need to talk about the benefits and risks to you and the baby.  If you smoke:  Not all products are approved for use to help stop smoking. Talk with the doctor to make sure that you have the right product.  New or worse mental, mood, or behavior problems have happened when bupropion has been used to stop smoking. These problems include thoughts of suicide or murder, depression, forceful actions, fury, anxiety, and anger. These problems have happened in people with and without a history of mental or mood problems. Talk with the doctor.    What are some side effects that I need to call my doctor about right away?  WARNING/CAUTION: Even though it may be rare, some people may have very bad and sometimes deadly side effects when taking a drug. Tell your doctor or get medical help right away if you have any of the following signs or symptoms that may be related to a very bad side effect:  Signs of an allergic reaction, like rash; hives; itching; red, swollen, blistered, or peeling skin with or without fever; wheezing; tightness in the chest or throat; trouble breathing, swallowing, or talking; unusual hoarseness; or swelling of the mouth, face, lips, tongue, or throat.  Signs of high blood pressure like very bad headache or dizziness, passing out, or change in eyesight.  Feeling confused, not able to focus, or change in behavior.  Hallucinations (seeing or hearing things that are not there).  If seizures are  new or worse after starting this drug.  Chest pain or pressure, a fast heartbeat, or an abnormal heartbeat.  Swelling.  Shortness of breath.  Change in hearing.  Ringing in ears.  Passing urine more often.  Swollen gland.  Trouble moving around.  Some people may have a higher chance of eye problems with this drug. Your doctor may want you to have an eye exam to see if you have a higher chance of these eye problems. Call your doctor right away if you have eye pain, change in eyesight, or swelling or redness in or around the eye.  A severe skin reaction (Dong-Fei syndrome/toxic epidermal necrolysis) may happen. It can cause severe health problems that may not go away, and sometimes death. Get medical help right away if you have signs like red, swollen, blistered, or peeling skin (with or without fever); red or irritated eyes; or sores in your mouth, throat, nose, or eyes.    What are some other side effects of this drug?  All drugs may cause side effects. However, many people have no side effects or only have minor side effects. Call your doctor or get medical help if any of these side effects or any other side effects bother you or do not go away:  All products:  Dizziness or headache.  Constipation, diarrhea, stomach pain, upset stomach, throwing up, or decreased appetite.  Shakiness.  Feeling nervous and excitable.  Strange or odd dreams.  Gas.  Dry mouth.  Trouble sleeping.  Muscle or joint pain.  Nose or throat irritation.  Sweating a lot.  A change in weight without trying.  Extended-release tablets:  For some brands, you may see the tablet shell in your stool. For these brands, this is normal and not a cause for concern. If you have questions, talk with your doctor.  These are not all of the side effects that may occur. If you have questions about side effects, call your doctor. Call your doctor for medical advice about side effects.  You may report side effects to your national health agency.    How is  this drug best taken?  Use this drug as ordered by your doctor. Read all information given to you. Follow all instructions closely.  For all uses of this drug:  Do not take this drug more often than you are told. This may raise the risk of seizures. Be sure you know how far apart to take your doses.  Take in the morning if taking once a day.  Take with or without food.  If you are not able to sleep, do not take this drug too close to bedtime. Talk with your doctor.  Swallow whole. Do not chew, break, or crush.  Keep taking this drug as you have been told by your doctor or other health care provider, even if you feel well.  If you have trouble swallowing, talk with your doctor.  For stopping smoking:  You may take this drug for 1 week before you stop smoking.  Nicotine products and counseling may be used at the same time for best results.  If you have not been able to quit smoking after taking this drug for 12 weeks, talk with your doctor.  You may have signs of nicotine withdrawal when you try to quit smoking even when using drugs like this one to help you quit smoking. There are many signs of nicotine withdrawal. Rarely depression and suicidal thoughts have happened in people trying to quit smoking. Talk with your doctor.    What do I do if I miss a dose?  Skip the missed dose and go back to your normal time.  Do not take 2 doses at the same time or extra doses.    How do I store and/or throw out this drug?  Store at room temperature protected from light. Store in a dry place. Do not store in a bathroom.  Keep all drugs in a safe place. Keep all drugs out of the reach of children and pets.  Throw away unused or  drugs. Do not flush down a toilet or pour down a drain unless you are told to do so. Check with your pharmacist if you have questions about the best way to throw out drugs. There may be drug take-back programs in your area.    General drug facts  If your symptoms or health problems do not get better  or if they become worse, call your doctor.  Do not share your drugs with others and do not take anyone else's drugs.  Some drugs may have another patient information leaflet. If you have any questions about this drug, please talk with your doctor, nurse, pharmacist, or other health care provider.  If you think there has been an overdose, call your poison control center or get medical care right away. Be ready to tell or show what was taken, how much, and when it happened.    Last Reviewed Date  2021-07-28  Consumer Information Use and Disclaimer  This generalized information is a limited summary of diagnosis, treatment, and/or medication information. It is not meant to be comprehensive and should be used as a tool to help the user understand and/or assess potential diagnostic and treatment options. It does NOT include all information about conditions, treatments, medications, side effects, or risks that may apply to a specific patient. It is not intended to be medical advice or a substitute for the medical advice, diagnosis, or treatment of a health care provider based on the health care provider's examination and assessment of a patient's specific and unique circumstances. Patients must speak with a health care provider for complete information about their health, medical questions, and treatment options, including any risks or benefits regarding use of medications. This information does not endorse any treatments or medications as safe, effective, or approved for treating a specific patient. [employer], Inc[employer] and its affiliates disclaim any warranty or liability relating to this information or the use thereof. The use of this information is governed by the Terms of Use, available at https://www.wolterskluwer.com/en/know/clinical-effectiveness-terms.    © 2023 [employer], Inc. and its affiliates and/or licensors. All rights reserved.  Use of [employer] is subject to the Terms of Use.

## 2024-01-04 NOTE — PROGRESS NOTES
Subjective   The patient location is: [place]  The chief complaint leading to consultation is: follow up    Visit type: audiovisual    Face to Face time with patient: 15  22 minutes of total time spent on the encounter, which includes face to face time and non-face to face time preparing to see the patient (eg, review of tests), Obtaining and/or reviewing separately obtained history, Documenting clinical information in the electronic or other health record, Independently interpreting results (not separately reported) and communicating results to the patient/family/caregiver, or Care coordination (not separately reported).         Each patient to whom he or she provides medical services by telemedicine is:  (1) informed of the relationship between the physician and patient and the respective role of any other health care provider with respect to management of the patient; and (2) notified that he or she may decline to receive medical services by telemedicine and may withdraw from such care at any time.    Notes:     Patient ID: Olga Alva is a 53 y.o. female.    Chief Complaint: follow up    HPI       Objective     PAST MEDICAL HISTORY:  Past Medical History:   Diagnosis Date    Anxiety     Arthritis     Depression     Hypercholesteremia          PAST SURGICAL HISTORY:  Past Surgical History:   Procedure Laterality Date    ANTERIOR VAGINAL REPAIR  11/28/2016    CARPAL TUNNEL RELEASE Right 09/03/2020    w/ Trigger finger    COLONOSCOPY N/A 08/03/2018    Procedure: COLONOSCOPY;  Surgeon: Mauricio Brown III, MD;  Location: [facility];  Service: Endoscopy;  Laterality: N/A;    FINGER CONTRACTURE RELEASE Right 08/10/2023    KNEE ARTHROSCOPY W/ ACL RECONSTRUCTION Left 2013    KNEE ARTHROSCOPY W/ MENISCECTOMY Right 2015    LAVH with salpingectomy Bilateral 11/28/2016    dysmenorrhea    PUBOVAGINAL SLING  11/28/2016    TRIGGER FINGER RELEASE Right     WISDOM TOOTH EXTRACTION         FAMILY HISTORY:  Family History    Problem Relation Age of Onset    Breast cancer Maternal Grandmother     Diabetes Maternal Grandmother     Hypertension Father     Diabetes Mother     Diabetes Brother     Hypertension Brother     Diabetes Sister     Miscarriages / Stillbirths Sister     Cancer Neg Hx     Colon cancer Neg Hx     Eclampsia Neg Hx     Ovarian cancer Neg Hx      labor Neg Hx     Stroke Neg Hx           SOCIAL HISTORY:  Social History     Social History Narrative    No pets or smokers in household.       MEDICATIONS:  Medications have been reviewed.    ALLERGIES:  Allergies have been reviewed.    There were no vitals filed for this visit.  Wt Readings from Last 10 Encounters:   23 80.6 kg (177 lb 11.1 oz)   23 80.4 kg (177 lb 4 oz)   23 80.4 kg (177 lb 4 oz)   23 78.9 kg (174 lb)   23 76.7 kg (169 lb 1.5 oz)   23 76.2 kg (168 lb)   10/21/22 76 kg (167 lb 8.8 oz)   22 73.5 kg (162 lb)   02/10/22 74.4 kg (164 lb)   21 77.2 kg (170 lb 3.1 oz)       Lab Results   Component Value Date    WBC 6.72 2020    HGB 14.2 2020    HCT 44.4 2020     2020    CHOL 162 2023    TRIG 83 2023    HDL 43 2023    ALT 22 2023    AST 20 2023     2023    K 4.6 2023     2023    CREATININE 1.1 2023    BUN 12 2023    CO2 24 2023    TSH 1.440 2018    GLUF 89 10/15/2012    HGBA1C 5.1 2023       Review of Systems   Constitutional:  Negative for activity change, appetite change, fatigue and fever.   HENT:  Negative for mouth dryness and goiter.    Eyes:  Negative for visual disturbance.   Respiratory:  Negative for apnea, cough, chest tightness and shortness of breath.    Cardiovascular:  Negative for chest pain, palpitations and leg swelling.   Gastrointestinal:  Negative for abdominal pain, constipation and diarrhea.   Endocrine: Negative for cold intolerance, heat intolerance, polydipsia,  polyphagia and polyuria.   Genitourinary:  Negative for frequency and menstrual problem.   Musculoskeletal:  Negative for arthralgias and myalgias.   Integumentary:  Negative for color change and rash.   Psychiatric/Behavioral:  Negative for sleep disturbance. The patient is not nervous/anxious.        Physical Exam  Constitutional:       General: She is not in acute distress.     Appearance: Normal appearance.   HENT:      Head: Normocephalic and atraumatic.   Eyes:      General: No scleral icterus.  Pulmonary:      Effort: Pulmonary effort is normal. No respiratory distress.   Neurological:      Mental Status: She is alert and oriented to person, place, and time.   Psychiatric:         Mood and Affect: Mood normal.         Behavior: Behavior normal.              Assessment and Plan     1. Weight gain  -     buPROPion (WELLBUTRIN SR) 100 MG TBSR 12 hr tablet; Take 1 tablet by mouth every morning for 1 week then 1 tablet twice daily  Dispense: 60 tablet; Refill: 1    2. Class 1 obesity due to excess calories with serious comorbidity and body mass index (BMI) of 31.0 to 31.9 in adult  -     buPROPion (WELLBUTRIN SR) 100 MG TBSR 12 hr tablet; Take 1 tablet by mouth every morning for 1 week then 1 tablet twice daily  Dispense: 60 tablet; Refill: 1  -     naltrexone (DEPADE) 50 mg tablet; Take 1/4 tablet by mouth twice daily  Dispense: 15 tablet; Refill: 1    3. Family history of diabetes mellitus (DM)    4. Primary osteoarthritis of both knees  Comments:  chronic/stable; doing pilates; feeling well        OA stable; no weight loss with naltrexone alone so adding wellbutrin     DW pt at length --risks, benefits, se's; given AVS with utd handout;   Advised pt should monitor for any mood change including worsening anxiety or suicidal thinking (if so stop medication seek care immediately), irritability, constipation, nausea, elevated blood pressure, seizures (pt has no history). Limit etoh.           Follow up in about 8  weeks (around 2/29/2024), or if symptoms worsen or fail to improve.

## 2024-01-05 ENCOUNTER — PATIENT MESSAGE (OUTPATIENT)
Dept: PRIMARY CARE CLINIC | Facility: CLINIC | Age: 54
End: 2024-01-05
Payer: COMMERCIAL

## 2024-01-05 ENCOUNTER — PATIENT MESSAGE (OUTPATIENT)
Dept: INTERNAL MEDICINE | Facility: CLINIC | Age: 54
End: 2024-01-05
Payer: COMMERCIAL

## 2024-01-05 DIAGNOSIS — E66.9 OBESITY, CLASS I, BMI 30-34.9: ICD-10-CM

## 2024-01-08 RX ORDER — TIRZEPATIDE 2.5 MG/.5ML
2.5 INJECTION, SOLUTION SUBCUTANEOUS
Qty: 4 PEN | Refills: 2 | Status: SHIPPED | OUTPATIENT
Start: 2024-01-08 | End: 2024-02-19

## 2024-01-08 NOTE — TELEPHONE ENCOUNTER
Mounjaro rx sent to [facility]'s, patient plans to have it compounded. She has f/u Dr Day new month.

## 2024-01-12 ENCOUNTER — PATIENT MESSAGE (OUTPATIENT)
Dept: PRIMARY CARE CLINIC | Facility: CLINIC | Age: 54
End: 2024-01-12
Payer: COMMERCIAL

## 2024-01-14 DIAGNOSIS — E78.5 DYSLIPIDEMIA: ICD-10-CM

## 2024-01-15 NOTE — TELEPHONE ENCOUNTER
No care due was identified.  Health [facility] Embedded Care Due Messages. Reference number: [id number].   1/14/2024 11:59:33 PM CST

## 2024-01-16 RX ORDER — ATORVASTATIN CALCIUM 10 MG/1
10 TABLET, FILM COATED ORAL
Qty: 90 TABLET | Refills: 3 | Status: SHIPPED | OUTPATIENT
Start: 2024-01-16 | End: 2024-06-06 | Stop reason: SDUPTHER

## 2024-01-16 NOTE — TELEPHONE ENCOUNTER
Refill Decision Note   Olga Alva  is requesting a refill authorization.  Brief Assessment and Rationale for Refill:  Approve     Medication Therapy Plan:         Comments:     Note composed:9:47 AM 01/16/2024

## 2024-01-26 ENCOUNTER — PATIENT MESSAGE (OUTPATIENT)
Dept: PRIMARY CARE CLINIC | Facility: CLINIC | Age: 54
End: 2024-01-26
Payer: COMMERCIAL

## 2024-01-28 ENCOUNTER — PATIENT MESSAGE (OUTPATIENT)
Dept: INTERNAL MEDICINE | Facility: CLINIC | Age: 54
End: 2024-01-28
Payer: COMMERCIAL

## 2024-01-28 DIAGNOSIS — E66.9 OBESITY, CLASS I, BMI 30-34.9: Primary | ICD-10-CM

## 2024-01-28 DIAGNOSIS — E78.5 DYSLIPIDEMIA: ICD-10-CM

## 2024-01-29 RX ORDER — TIRZEPATIDE 5 MG/.5ML
5 INJECTION, SOLUTION SUBCUTANEOUS
Qty: 4 PEN | Refills: 0 | Status: SHIPPED | OUTPATIENT
Start: 2024-01-29 | End: 2024-04-19 | Stop reason: SDUPTHER

## 2024-01-29 RX ORDER — TIRZEPATIDE 5 MG/.5ML
5 INJECTION, SOLUTION SUBCUTANEOUS
Qty: 4 PEN | Refills: 0 | Status: SHIPPED | OUTPATIENT
Start: 2024-01-29 | End: 2024-02-01 | Stop reason: SDUPTHER

## 2024-02-01 ENCOUNTER — PATIENT MESSAGE (OUTPATIENT)
Dept: PRIMARY CARE CLINIC | Facility: CLINIC | Age: 54
End: 2024-02-01

## 2024-02-01 ENCOUNTER — CLINICAL SUPPORT (OUTPATIENT)
Dept: PRIMARY CARE CLINIC | Facility: CLINIC | Age: 54
End: 2024-02-01
Payer: COMMERCIAL

## 2024-02-01 DIAGNOSIS — E66.9 OBESITY, CLASS I, BMI 30-34.9: ICD-10-CM

## 2024-02-01 PROCEDURE — 99999 PR PBB SHADOW E&M-EST. PATIENT-LVL I: CPT | Mod: PBBFAC,,,

## 2024-02-01 RX ORDER — TIRZEPATIDE 5 MG/.5ML
5 INJECTION, SOLUTION SUBCUTANEOUS
Qty: 4 PEN | Refills: 0 | Status: SHIPPED | OUTPATIENT
Start: 2024-02-01 | End: 2024-02-28 | Stop reason: SDUPTHER

## 2024-02-01 RX ORDER — TIRZEPATIDE 5 MG/.5ML
5 INJECTION, SOLUTION SUBCUTANEOUS
Qty: 4 PEN | Refills: 0 | Status: SHIPPED | OUTPATIENT
Start: 2024-02-01 | End: 2024-02-01 | Stop reason: SDUPTHER

## 2024-02-01 NOTE — PROGRESS NOTES
Spoke with patient. Given rx for zepbound/tirzepatide.   Reviewed with her I do not send rx to compounding pharmacies.  Will give rx so she can check price with this-she will check pricing.   [facility]; [facility] filled out coupon.

## 2024-02-21 ENCOUNTER — PATIENT MESSAGE (OUTPATIENT)
Dept: PRIMARY CARE CLINIC | Facility: CLINIC | Age: 54
End: 2024-02-21
Payer: COMMERCIAL

## 2024-02-28 ENCOUNTER — PATIENT MESSAGE (OUTPATIENT)
Dept: PRIMARY CARE CLINIC | Facility: CLINIC | Age: 54
End: 2024-02-28
Payer: COMMERCIAL

## 2024-02-28 DIAGNOSIS — E66.9 OBESITY, CLASS I, BMI 30-34.9: ICD-10-CM

## 2024-02-28 RX ORDER — TIRZEPATIDE 5 MG/.5ML
INJECTION, SOLUTION SUBCUTANEOUS
Qty: 4 PEN | Refills: 0 | Status: SHIPPED | OUTPATIENT
Start: 2024-02-28

## 2024-04-11 ENCOUNTER — PATIENT MESSAGE (OUTPATIENT)
Dept: PRIMARY CARE CLINIC | Facility: CLINIC | Age: 54
End: 2024-04-11
Payer: COMMERCIAL

## 2024-04-18 ENCOUNTER — PATIENT MESSAGE (OUTPATIENT)
Dept: PRIMARY CARE CLINIC | Facility: CLINIC | Age: 54
End: 2024-04-18
Payer: COMMERCIAL

## 2024-04-18 DIAGNOSIS — E66.9 OBESITY, CLASS I, BMI 30-34.9: ICD-10-CM

## 2024-04-18 DIAGNOSIS — E78.5 DYSLIPIDEMIA: ICD-10-CM

## 2024-04-19 RX ORDER — TIRZEPATIDE 5 MG/.5ML
5 INJECTION, SOLUTION SUBCUTANEOUS
Qty: 4 PEN | Refills: 0 | Status: SHIPPED | OUTPATIENT
Start: 2024-04-19

## 2024-04-24 ENCOUNTER — OFFICE VISIT (OUTPATIENT)
Dept: PRIMARY CARE CLINIC | Facility: CLINIC | Age: 54
End: 2024-04-24
Payer: COMMERCIAL

## 2024-04-24 DIAGNOSIS — Z83.3 FAMILY HISTORY OF DIABETES MELLITUS (DM): Primary | ICD-10-CM

## 2024-04-24 DIAGNOSIS — E66.09 CLASS 1 OBESITY DUE TO EXCESS CALORIES WITH SERIOUS COMORBIDITY AND BODY MASS INDEX (BMI) OF 31.0 TO 31.9 IN ADULT: ICD-10-CM

## 2024-04-24 DIAGNOSIS — E78.5 DYSLIPIDEMIA: ICD-10-CM

## 2024-04-24 PROCEDURE — 99213 OFFICE O/P EST LOW 20 MIN: CPT | Mod: 95,,, | Performed by: FAMILY MEDICINE

## 2024-04-24 NOTE — PROGRESS NOTES
Subjective     The patient location is: home/[place]  The chief complaint leading to consultation is: follow up    Visit type: audiovisual    Face to Face time with patient: 11  13 minutes of total time spent on the encounter, which includes face to face time and non-face to face time preparing to see the patient (eg, review of tests), Obtaining and/or reviewing separately obtained history, Documenting clinical information in the electronic or other health record, Independently interpreting results (not separately reported) and communicating results to the patient/family/caregiver, or Care coordination (not separately reported).         Each patient to whom he or she provides medical services by telemedicine is:  (1) informed of the relationship between the physician and patient and the respective role of any other health care provider with respect to management of the patient; and (2) notified that he or she may decline to receive medical services by telemedicine and may withdraw from such care at any time.    Notes:     Patient ID: Olga Alva is a 54 y.o. female.    Chief Complaint: follow up    Hx of dyslipidemia--last 2 lab sets improved.   Pt has pos fam hx of DM II.    Pt had death in family, had to help brother.   Moving to [facility]. She retired at the end of March.   Has to be at home today for house closing.     Pt is -10 lb lower dose. She is a 5 mg; would like to try 7.5 mg. Pt is considering stopping due to availability.     Likes progeterone cream.     Tolerating lipitor.     Has Dr Olivera.    Pt reports doing well overall.   No throat pain, hoarseness, palpable neck mass.   Nausea: none  Abd pain: none  Constipation: none  Gastroparesis: none  GERD: none   Energy: good  Lifestyle changes: as above  Mental Health: pt states good; no acute changes     HPI       Objective     PAST MEDICAL HISTORY:  Past Medical History:   Diagnosis Date    Anxiety     Arthritis     Depression     Hypercholesteremia           PAST SURGICAL HISTORY:  Past Surgical History:   Procedure Laterality Date    ANTERIOR VAGINAL REPAIR  2016    CARPAL TUNNEL RELEASE Right 2020    w/ Trigger finger    COLONOSCOPY N/A 2018    Procedure: COLONOSCOPY;  Surgeon: Mauricio Brown III, MD;  Location: [facility];  Service: Endoscopy;  Laterality: N/A;    FINGER CONTRACTURE RELEASE Right 08/10/2023    KNEE ARTHROSCOPY W/ ACL RECONSTRUCTION Left     KNEE ARTHROSCOPY W/ MENISCECTOMY Right     LAVH with salpingectomy Bilateral 2016    dysmenorrhea    PUBOVAGINAL SLING  2016    TRIGGER FINGER RELEASE Right     WISDOM TOOTH EXTRACTION         FAMILY HISTORY:  Family History   Problem Relation Name Age of Onset    Breast cancer Maternal Grandmother      Diabetes Maternal Grandmother      Hypertension Father      Diabetes Mother      Diabetes Brother      Hypertension Brother      Diabetes Sister      Miscarriages / Stillbirths Sister      Cancer Neg Hx      Colon cancer Neg Hx      Eclampsia Neg Hx      Ovarian cancer Neg Hx       labor Neg Hx      Stroke Neg Hx            SOCIAL HISTORY:  Social History     Social History Narrative    No pets or smokers in household.       MEDICATIONS:  Medications have been reviewed.    ALLERGIES:  Allergies have been reviewed.    There were no vitals filed for this visit.  Wt Readings from Last 10 Encounters:   24 79.4 kg (175 lb)   23 80.6 kg (177 lb 11.1 oz)   23 80.4 kg (177 lb 4 oz)   23 80.4 kg (177 lb 4 oz)   23 78.9 kg (174 lb)   23 76.7 kg (169 lb 1.5 oz)   23 76.2 kg (168 lb)   10/21/22 76 kg (167 lb 8.8 oz)   22 73.5 kg (162 lb)   02/10/22 74.4 kg (164 lb)       Lab Results   Component Value Date    WBC 6.72 2020    HGB 14.2 2020    HCT 44.4 2020     2020    CHOL 162 2023    TRIG 83 2023    HDL 43 2023    ALT 22 2023    AST 20 2023     2023    K  4.6 11/22/2023     11/22/2023    CREATININE 1.1 11/22/2023    BUN 12 11/22/2023    CO2 24 11/22/2023    TSH 1.440 05/04/2018    GLUF 89 10/15/2012    HGBA1C 5.1 11/22/2023       Review of Systems   Constitutional:  Negative for activity change, appetite change, fatigue and fever.   HENT:  Negative for mouth dryness and goiter.    Eyes:  Negative for visual disturbance.   Respiratory:  Negative for apnea, cough, chest tightness and shortness of breath.    Cardiovascular:  Negative for chest pain, palpitations and leg swelling.   Gastrointestinal:  Negative for abdominal pain, constipation, diarrhea, nausea, vomiting and reflux.   Endocrine: Negative for cold intolerance, heat intolerance, polydipsia, polyphagia and polyuria.   Genitourinary:  Negative for frequency and menstrual problem.   Musculoskeletal:  Negative for arthralgias and myalgias.   Integumentary:  Negative for color change and rash.   Psychiatric/Behavioral:  Negative for self-injury, sleep disturbance and suicidal ideas. The patient is not nervous/anxious.        Physical Exam  Vitals and nursing note reviewed.   Constitutional:       General: She is not in acute distress.     Appearance: Normal appearance.   HENT:      Head: Normocephalic and atraumatic.      Mouth/Throat:      Pharynx: Oropharynx is clear.   Eyes:      General: No scleral icterus.  Neck:      Comments: No TM  Pulmonary:      Effort: Pulmonary effort is normal.      Breath sounds: Normal breath sounds.   Abdominal:      Palpations: Abdomen is soft.   Musculoskeletal:         General: No swelling.   Skin:     General: Skin is warm.   Neurological:      Mental Status: She is alert and oriented to person, place, and time.   Psychiatric:         Mood and Affect: Mood normal.         Behavior: Behavior normal.              Assessment and Plan         1. Family history of diabetes mellitus (DM)    2. Class 1 obesity due to excess calories with serious comorbidity and body mass index  (BMI) of 31.0 to 31.9 in adult    3. Dyslipidemia      Family history of diabetes mellitus (DM)    Class 1 obesity due to excess calories with serious comorbidity and body mass index (BMI) of 31.0 to 31.9 in adult    Dyslipidemia      Pt off all medication; zepbound is cost prohibitive.   She is in process of moving.   Reviewed with her to make sure to seek pcp appt  She will send in vitals/recent weight  At the present she will work on lifestyle interventions         Follow up if symptoms worsen or fail to improve.

## 2024-06-06 DIAGNOSIS — E78.5 DYSLIPIDEMIA: ICD-10-CM

## 2024-06-06 RX ORDER — ATORVASTATIN CALCIUM 10 MG/1
10 TABLET, FILM COATED ORAL DAILY
Qty: 90 TABLET | Refills: 3 | Status: SHIPPED | OUTPATIENT
Start: 2024-06-06

## 2024-06-06 NOTE — TELEPHONE ENCOUNTER
No care due was identified.  [facility] Embedded Care Due Messages. Reference number: [id number].   6/06/2024 3:13:35 PM CDT

## 2024-07-02 ENCOUNTER — PATIENT MESSAGE (OUTPATIENT)
Dept: INTERNAL MEDICINE | Facility: CLINIC | Age: 54
End: 2024-07-02
Payer: COMMERCIAL

## 2024-08-16 ENCOUNTER — PATIENT MESSAGE (OUTPATIENT)
Dept: INTERNAL MEDICINE | Facility: CLINIC | Age: 54
End: 2024-08-16
Payer: COMMERCIAL

## 2024-11-04 ENCOUNTER — TELEPHONE (OUTPATIENT)
Dept: FAMILY MEDICINE | Facility: CLINIC | Age: 54
End: 2024-11-04
Payer: COMMERCIAL

## 2024-11-04 NOTE — TELEPHONE ENCOUNTER
----- Message from Davida sent at 11/4/2024 11:57 AM CST -----  Regarding: call back  Type: Patient Call Back    Who called: pt      What is the request in detail: requesting call to discuss upcoming ECA     Can the clinic reply by [facility]?no    Would the patient rather a call back or a response via [facility]? call    Best call back number: [phone]     Additional Information: